# Patient Record
Sex: FEMALE | Race: WHITE | NOT HISPANIC OR LATINO | Employment: UNEMPLOYED | URBAN - METROPOLITAN AREA
[De-identification: names, ages, dates, MRNs, and addresses within clinical notes are randomized per-mention and may not be internally consistent; named-entity substitution may affect disease eponyms.]

---

## 2019-01-18 PROCEDURE — 99285 EMERGENCY DEPT VISIT HI MDM: CPT | Mod: ,,, | Performed by: EMERGENCY MEDICINE

## 2019-01-18 PROCEDURE — 99285 PR EMERGENCY DEPT VISIT,LEVEL V: ICD-10-PCS | Mod: ,,, | Performed by: EMERGENCY MEDICINE

## 2019-01-18 PROCEDURE — 99285 EMERGENCY DEPT VISIT HI MDM: CPT | Mod: 25

## 2019-01-19 ENCOUNTER — HOSPITAL ENCOUNTER (INPATIENT)
Facility: HOSPITAL | Age: 24
LOS: 9 days | Discharge: HOME | DRG: 897 | End: 2019-01-28
Attending: PSYCHIATRY & NEUROLOGY | Admitting: PSYCHIATRY & NEUROLOGY
Payer: COMMERCIAL

## 2019-01-19 ENCOUNTER — HOSPITAL ENCOUNTER (EMERGENCY)
Facility: HOSPITAL | Age: 24
Discharge: PSYCHIATRIC OR SUBSTANCE USE FACILITY | End: 2019-01-19
Attending: EMERGENCY MEDICINE
Payer: COMMERCIAL

## 2019-01-19 ENCOUNTER — HOSPITAL ENCOUNTER (EMERGENCY)
Facility: HOSPITAL | Age: 24
End: 2019-01-19
Attending: EMERGENCY MEDICINE
Payer: COMMERCIAL

## 2019-01-19 ENCOUNTER — HOSPITAL ENCOUNTER (INPATIENT)
Facility: HOSPITAL | Age: 24
LOS: 1 days | Discharge: ANOTHER HOSPITAL | DRG: 897 | End: 2019-01-19
Attending: PSYCHIATRY & NEUROLOGY | Admitting: PSYCHIATRY & NEUROLOGY
Payer: COMMERCIAL

## 2019-01-19 VITALS
RESPIRATION RATE: 12 BRPM | DIASTOLIC BLOOD PRESSURE: 77 MMHG | BODY MASS INDEX: 23.73 KG/M2 | HEART RATE: 76 BPM | WEIGHT: 110 LBS | HEIGHT: 57 IN | OXYGEN SATURATION: 100 % | TEMPERATURE: 98 F | SYSTOLIC BLOOD PRESSURE: 118 MMHG

## 2019-01-19 VITALS
RESPIRATION RATE: 12 BRPM | SYSTOLIC BLOOD PRESSURE: 89 MMHG | HEART RATE: 59 BPM | BODY MASS INDEX: 22.25 KG/M2 | DIASTOLIC BLOOD PRESSURE: 50 MMHG | HEIGHT: 60 IN | WEIGHT: 113.31 LBS

## 2019-01-19 VITALS
DIASTOLIC BLOOD PRESSURE: 61 MMHG | HEART RATE: 72 BPM | RESPIRATION RATE: 12 BRPM | TEMPERATURE: 98 F | OXYGEN SATURATION: 98 % | SYSTOLIC BLOOD PRESSURE: 99 MMHG

## 2019-01-19 DIAGNOSIS — M79.7 FIBROMYALGIA: ICD-10-CM

## 2019-01-19 DIAGNOSIS — I95.9 TRANSIENT HYPOTENSION: Primary | ICD-10-CM

## 2019-01-19 DIAGNOSIS — F12.20 CANNABIS USE DISORDER, SEVERE, DEPENDENCE: Chronic | ICD-10-CM

## 2019-01-19 DIAGNOSIS — R45.1 AGITATION: Primary | ICD-10-CM

## 2019-01-19 DIAGNOSIS — I95.9 HYPOTENSION: ICD-10-CM

## 2019-01-19 DIAGNOSIS — F19.950 SUBSTANCE-INDUCED PSYCHOTIC DISORDER WITH DELUSIONS: ICD-10-CM

## 2019-01-19 DIAGNOSIS — F29 PSYCHOSIS: ICD-10-CM

## 2019-01-19 DIAGNOSIS — F29 PSYCHOSIS, UNSPECIFIED PSYCHOSIS TYPE: ICD-10-CM

## 2019-01-19 DIAGNOSIS — I95.9 HYPOTENSION, UNSPECIFIED HYPOTENSION TYPE: ICD-10-CM

## 2019-01-19 DIAGNOSIS — F15.20 AMPHETAMINE USE DISORDER, SEVERE, DEPENDENCE: Primary | Chronic | ICD-10-CM

## 2019-01-19 LAB
ALBUMIN SERPL BCP-MCNC: 4 G/DL
ALP SERPL-CCNC: 76 U/L
ALT SERPL W/O P-5'-P-CCNC: 10 U/L
AMPHET+METHAMPHET UR QL: NORMAL
ANION GAP SERPL CALC-SCNC: 10 MMOL/L
APAP SERPL-MCNC: <3 UG/ML
AST SERPL-CCNC: 20 U/L
B-HCG UR QL: NEGATIVE
BARBITURATES UR QL SCN>200 NG/ML: NEGATIVE
BASOPHILS # BLD AUTO: 0.05 K/UL
BASOPHILS NFR BLD: 0.3 %
BENZODIAZ UR QL SCN>200 NG/ML: NORMAL
BILIRUB SERPL-MCNC: 0.2 MG/DL
BILIRUB UR QL STRIP: NEGATIVE
BUN SERPL-MCNC: 10 MG/DL
BZE UR QL SCN: NEGATIVE
CALCIUM SERPL-MCNC: 9.3 MG/DL
CANNABINOIDS UR QL SCN: NEGATIVE
CHLORIDE SERPL-SCNC: 110 MMOL/L
CLARITY UR REFRACT.AUTO: CLEAR
CO2 SERPL-SCNC: 22 MMOL/L
COLOR UR AUTO: YELLOW
CREAT SERPL-MCNC: 0.8 MG/DL
CREAT UR-MCNC: 203 MG/DL
CTP QC/QA: YES
DIFFERENTIAL METHOD: ABNORMAL
EOSINOPHIL # BLD AUTO: 0.3 K/UL
EOSINOPHIL NFR BLD: 1.8 %
ERYTHROCYTE [DISTWIDTH] IN BLOOD BY AUTOMATED COUNT: 12.7 %
EST. GFR  (AFRICAN AMERICAN): >60 ML/MIN/1.73 M^2
EST. GFR  (NON AFRICAN AMERICAN): >60 ML/MIN/1.73 M^2
ETHANOL SERPL-MCNC: <10 MG/DL
GLUCOSE SERPL-MCNC: 93 MG/DL
GLUCOSE UR QL STRIP: NEGATIVE
HCT VFR BLD AUTO: 38.4 %
HGB BLD-MCNC: 12.4 G/DL
HGB UR QL STRIP: ABNORMAL
HYALINE CASTS UR QL AUTO: 1 /LPF
IMM GRANULOCYTES # BLD AUTO: 0.04 K/UL
IMM GRANULOCYTES NFR BLD AUTO: 0.3 %
KETONES UR QL STRIP: NEGATIVE
LEUKOCYTE ESTERASE UR QL STRIP: ABNORMAL
LYMPHOCYTES # BLD AUTO: 2.3 K/UL
LYMPHOCYTES NFR BLD: 15.1 %
MCH RBC QN AUTO: 29.2 PG
MCHC RBC AUTO-ENTMCNC: 32.3 G/DL
MCV RBC AUTO: 91 FL
METHADONE UR QL SCN>300 NG/ML: NEGATIVE
MICROSCOPIC COMMENT: NORMAL
MONOCYTES # BLD AUTO: 1 K/UL
MONOCYTES NFR BLD: 6.6 %
NEUTROPHILS # BLD AUTO: 11.3 K/UL
NEUTROPHILS NFR BLD: 75.9 %
NITRITE UR QL STRIP: NEGATIVE
NRBC BLD-RTO: 0 /100 WBC
OPIATES UR QL SCN: NEGATIVE
PCP UR QL SCN>25 NG/ML: NEGATIVE
PH UR STRIP: 6 [PH] (ref 5–8)
PLATELET # BLD AUTO: 316 K/UL
PMV BLD AUTO: 9.2 FL
POCT GLUCOSE: 83 MG/DL (ref 70–110)
POTASSIUM SERPL-SCNC: 3.5 MMOL/L
PROT SERPL-MCNC: 7.4 G/DL
PROT UR QL STRIP: NEGATIVE
RBC # BLD AUTO: 4.24 M/UL
RBC #/AREA URNS AUTO: 4 /HPF (ref 0–4)
SODIUM SERPL-SCNC: 142 MMOL/L
SP GR UR STRIP: 1.02 (ref 1–1.03)
SQUAMOUS #/AREA URNS AUTO: 3 /HPF
TOXICOLOGY INFORMATION: NORMAL
TSH SERPL DL<=0.005 MIU/L-ACNC: 1.37 UIU/ML
URN SPEC COLLECT METH UR: ABNORMAL
WBC # BLD AUTO: 14.89 K/UL
WBC #/AREA URNS AUTO: 4 /HPF (ref 0–5)

## 2019-01-19 PROCEDURE — 93005 ELECTROCARDIOGRAM TRACING: CPT

## 2019-01-19 PROCEDURE — 99284 EMERGENCY DEPT VISIT MOD MDM: CPT | Mod: 25

## 2019-01-19 PROCEDURE — 99223 1ST HOSP IP/OBS HIGH 75: CPT | Mod: ,,, | Performed by: PSYCHIATRY & NEUROLOGY

## 2019-01-19 PROCEDURE — 80320 DRUG SCREEN QUANTALCOHOLS: CPT

## 2019-01-19 PROCEDURE — 25000003 PHARM REV CODE 250: Performed by: EMERGENCY MEDICINE

## 2019-01-19 PROCEDURE — 99284 EMERGENCY DEPT VISIT MOD MDM: CPT | Mod: ,,, | Performed by: EMERGENCY MEDICINE

## 2019-01-19 PROCEDURE — 96360 HYDRATION IV INFUSION INIT: CPT

## 2019-01-19 PROCEDURE — 80053 COMPREHEN METABOLIC PANEL: CPT

## 2019-01-19 PROCEDURE — 84443 ASSAY THYROID STIM HORMONE: CPT

## 2019-01-19 PROCEDURE — 12400001 HC PSYCH SEMI-PRIVATE ROOM

## 2019-01-19 PROCEDURE — 99223 PR INITIAL HOSPITAL CARE,LEVL III: ICD-10-PCS | Mod: ,,, | Performed by: PSYCHIATRY & NEUROLOGY

## 2019-01-19 PROCEDURE — 63600175 PHARM REV CODE 636 W HCPCS: Performed by: EMERGENCY MEDICINE

## 2019-01-19 PROCEDURE — 99284 PR EMERGENCY DEPT VISIT,LEVEL IV: ICD-10-PCS | Mod: ,,, | Performed by: EMERGENCY MEDICINE

## 2019-01-19 PROCEDURE — 85025 COMPLETE CBC W/AUTO DIFF WBC: CPT

## 2019-01-19 PROCEDURE — 93010 ELECTROCARDIOGRAM REPORT: CPT | Mod: ,,, | Performed by: INTERNAL MEDICINE

## 2019-01-19 PROCEDURE — 25000003 PHARM REV CODE 250: Performed by: INTERNAL MEDICINE

## 2019-01-19 PROCEDURE — 80307 DRUG TEST PRSMV CHEM ANLYZR: CPT

## 2019-01-19 PROCEDURE — 96361 HYDRATE IV INFUSION ADD-ON: CPT

## 2019-01-19 PROCEDURE — 81025 URINE PREGNANCY TEST: CPT | Performed by: EMERGENCY MEDICINE

## 2019-01-19 PROCEDURE — 80329 ANALGESICS NON-OPIOID 1 OR 2: CPT

## 2019-01-19 PROCEDURE — 81001 URINALYSIS AUTO W/SCOPE: CPT | Mod: 59

## 2019-01-19 PROCEDURE — 93010 EKG 12-LEAD: ICD-10-PCS | Mod: ,,, | Performed by: INTERNAL MEDICINE

## 2019-01-19 RX ORDER — HALOPERIDOL 5 MG/ML
5 INJECTION INTRAMUSCULAR
Status: COMPLETED | OUTPATIENT
Start: 2019-01-19 | End: 2019-01-19

## 2019-01-19 RX ORDER — DOCUSATE SODIUM 100 MG/1
100 CAPSULE, LIQUID FILLED ORAL DAILY PRN
Status: DISCONTINUED | OUTPATIENT
Start: 2019-01-19 | End: 2019-01-28 | Stop reason: HOSPADM

## 2019-01-19 RX ORDER — FOLIC ACID 1 MG/1
1 TABLET ORAL DAILY
Status: CANCELLED | OUTPATIENT
Start: 2019-01-20

## 2019-01-19 RX ORDER — DOCUSATE SODIUM 100 MG/1
100 CAPSULE, LIQUID FILLED ORAL DAILY PRN
Status: DISCONTINUED | OUTPATIENT
Start: 2019-01-19 | End: 2019-01-19 | Stop reason: HOSPADM

## 2019-01-19 RX ORDER — ACETAMINOPHEN 325 MG/1
650 TABLET ORAL EVERY 6 HOURS PRN
Status: DISCONTINUED | OUTPATIENT
Start: 2019-01-19 | End: 2019-01-19 | Stop reason: HOSPADM

## 2019-01-19 RX ORDER — CALCIUM CARBONATE 200(500)MG
1000 TABLET,CHEWABLE ORAL EVERY 8 HOURS PRN
Status: DISCONTINUED | OUTPATIENT
Start: 2019-01-19 | End: 2019-01-28 | Stop reason: HOSPADM

## 2019-01-19 RX ORDER — OLANZAPINE 10 MG/2ML
5 INJECTION, POWDER, FOR SOLUTION INTRAMUSCULAR EVERY 6 HOURS PRN
Status: DISCONTINUED | OUTPATIENT
Start: 2019-01-19 | End: 2019-01-28 | Stop reason: HOSPADM

## 2019-01-19 RX ORDER — DIPHENHYDRAMINE HYDROCHLORIDE 50 MG/ML
50 INJECTION INTRAMUSCULAR; INTRAVENOUS EVERY 4 HOURS PRN
Status: DISCONTINUED | OUTPATIENT
Start: 2019-01-19 | End: 2019-01-19

## 2019-01-19 RX ORDER — IBUPROFEN 200 MG
1 TABLET ORAL DAILY PRN
Status: DISCONTINUED | OUTPATIENT
Start: 2019-01-19 | End: 2019-01-28 | Stop reason: HOSPADM

## 2019-01-19 RX ORDER — IBUPROFEN 200 MG
1 TABLET ORAL DAILY
Status: DISCONTINUED | OUTPATIENT
Start: 2019-01-19 | End: 2019-01-19 | Stop reason: HOSPADM

## 2019-01-19 RX ORDER — ACETAMINOPHEN 325 MG/1
650 TABLET ORAL EVERY 6 HOURS PRN
Status: DISCONTINUED | OUTPATIENT
Start: 2019-01-19 | End: 2019-01-28 | Stop reason: HOSPADM

## 2019-01-19 RX ORDER — OLANZAPINE 10 MG/2ML
10 INJECTION, POWDER, FOR SOLUTION INTRAMUSCULAR EVERY 8 HOURS PRN
Status: DISCONTINUED | OUTPATIENT
Start: 2019-01-19 | End: 2019-01-19 | Stop reason: HOSPADM

## 2019-01-19 RX ORDER — DIPHENHYDRAMINE HYDROCHLORIDE 50 MG/ML
25 INJECTION INTRAMUSCULAR; INTRAVENOUS
Status: COMPLETED | OUTPATIENT
Start: 2019-01-19 | End: 2019-01-19

## 2019-01-19 RX ORDER — OLANZAPINE 5 MG/1
5 TABLET ORAL EVERY 6 HOURS PRN
Status: DISCONTINUED | OUTPATIENT
Start: 2019-01-19 | End: 2019-01-28 | Stop reason: HOSPADM

## 2019-01-19 RX ORDER — FOLIC ACID 1 MG/1
1 TABLET ORAL DAILY
Status: DISCONTINUED | OUTPATIENT
Start: 2019-01-19 | End: 2019-01-19 | Stop reason: HOSPADM

## 2019-01-19 RX ORDER — HYDROXYZINE PAMOATE 50 MG/1
50 CAPSULE ORAL NIGHTLY PRN
Status: DISCONTINUED | OUTPATIENT
Start: 2019-01-19 | End: 2019-01-25

## 2019-01-19 RX ORDER — OLANZAPINE 10 MG/1
10 TABLET ORAL EVERY 8 HOURS PRN
Status: DISCONTINUED | OUTPATIENT
Start: 2019-01-19 | End: 2019-01-19 | Stop reason: HOSPADM

## 2019-01-19 RX ORDER — CALCIUM CARBONATE 200(500)MG
1000 TABLET,CHEWABLE ORAL EVERY 8 HOURS PRN
Status: DISCONTINUED | OUTPATIENT
Start: 2019-01-19 | End: 2019-01-19 | Stop reason: HOSPADM

## 2019-01-19 RX ORDER — HALOPERIDOL 5 MG/ML
5 INJECTION INTRAMUSCULAR EVERY 4 HOURS PRN
Status: DISCONTINUED | OUTPATIENT
Start: 2019-01-19 | End: 2019-01-19 | Stop reason: HOSPADM

## 2019-01-19 RX ORDER — SODIUM CHLORIDE 9 MG/ML
1000 INJECTION, SOLUTION INTRAVENOUS
Status: COMPLETED | OUTPATIENT
Start: 2019-01-19 | End: 2019-01-19

## 2019-01-19 RX ORDER — LORAZEPAM 2 MG/ML
2 INJECTION INTRAMUSCULAR EVERY 4 HOURS PRN
Status: DISCONTINUED | OUTPATIENT
Start: 2019-01-19 | End: 2019-01-19 | Stop reason: HOSPADM

## 2019-01-19 RX ORDER — LORAZEPAM 2 MG/ML
2 INJECTION INTRAMUSCULAR
Status: COMPLETED | OUTPATIENT
Start: 2019-01-19 | End: 2019-01-19

## 2019-01-19 RX ADMIN — LORAZEPAM 2 MG: 2 INJECTION, SOLUTION INTRAMUSCULAR; INTRAVENOUS at 01:01

## 2019-01-19 RX ADMIN — SODIUM CHLORIDE 1000 ML: 0.9 INJECTION, SOLUTION INTRAVENOUS at 04:01

## 2019-01-19 RX ADMIN — DIPHENHYDRAMINE HYDROCHLORIDE 25 MG: 50 INJECTION INTRAMUSCULAR; INTRAVENOUS at 01:01

## 2019-01-19 RX ADMIN — SODIUM CHLORIDE 1000 ML: 0.9 INJECTION, SOLUTION INTRAVENOUS at 03:01

## 2019-01-19 RX ADMIN — HALOPERIDOL LACTATE 5 MG: 5 INJECTION, SOLUTION INTRAMUSCULAR at 01:01

## 2019-01-19 NOTE — ED NOTES
Patient accepted by Kaycee at [facility] ([address], [place], [place]) for the service of Dr. Schultz.   Report to be called to [facility] unit [phone].
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
## 2019-01-19 NOTE — NURSING
Did rounds on patient.  Difficult to arouse. Did not arouse with verbal stimuli.  Minimal arousal with tactile stimuli. Dr. Gr in the room.  Blood pressure 72/46 with pulse 64.  Retook blood pressure 72/46 with a pulse of 60.  Dr. Gr ask for a rapid respond.  Rapid response called.  Blood sugar taken 83.  Pulse Ox 100.  EKG WNL.  Rapid response team wanted patient discharged to the ED.  Orders received and patient transferred to ED.  Report given to [place] charge nurse given report.  Unit director notified of situation.  Patient to return to the unit when medically stable.

## 2019-01-19 NOTE — SUBJECTIVE & OBJECTIVE
Patient History           Medical as of 1/19/2019    Past Medical History: Patient provided no pertinent medical history.           Surgical as of 1/19/2019    Past Surgical History: Patient provided no pertinent surgical history.           Family as of 1/19/2019    None           Tobacco Use as of 1/19/2019     Smoking Status Smoking Start Date Smoking Quit Date Packs/Day Years Used    Current Every Day Smoker -- -- 1.00 --    Types Comments Smokeless Tobacco Status Smokeless Tobacco Quit Date Source     Cigarettes -- Unknown -- Provider            Alcohol Use as of 1/19/2019     Alcohol Use Drinks/Week Alcohol/Week Comments Source    No -- -- -- Provider    Frequency Standard Drinks Binge Drinking Source      Never -- -- Provider             Drug Use as of 1/19/2019     Drug Use Types Frequency Comments Source    Yes  Methamphetamines -- -- Provider            Sexual Activity as of 1/19/2019     Sexually Active Birth Control Partners Comments Source    -- -- -- -- Provider            Activities of Daily Living as of 1/19/2019    None           Social Documentation as of 1/19/2019    None           Occupational as of 1/19/2019    None           Socioeconomic as of 1/19/2019     Marital Status Spouse Name Number of Children Years Education Education Level Preferred Language Ethnicity Race Source    Single -- -- -- -- English /White White --    Financial Resource Strain Food Insecurity: Worry Food Insecurity: Inability Transportation Needs: Medical Transportation Needs: Non-medical       -- -- -- -- --             Pertinent History     Question Response Comments    Lives with -- --    Place in Birth Order -- --    Lives in -- --    Number of Siblings -- --    Raised by -- --    Legal Involvement -- --    Childhood Trauma -- --    Criminal History of -- --    Financial Status -- --    Highest Level of Education -- --    Does patient have access to a firearm? -- --     Service -- --    Primary  "Leisure Activity -- --    Spirituality -- --        History reviewed. No pertinent past medical history.  History reviewed. No pertinent surgical history.  Family History     None        Tobacco Use    Smoking status: Current Every Day Smoker     Packs/day: 1.00     Types: Cigarettes   Substance and Sexual Activity    Alcohol use: No     Frequency: Never    Drug use: Yes     Types: Methamphetamines    Sexual activity: Not on file     Review of patient's allergies indicates:   Allergen Reactions    Cat/feline products        No current facility-administered medications on file prior to encounter.      No current outpatient medications on file prior to encounter.     Psychotherapeutics (From admission, onward)    Start     Stop Route Frequency Ordered    01/19/19 0440  lorazepam injection 2 mg  (Psych Hold Orders)      -- IM Every 4 hours PRN 01/19/19 0340    01/19/19 0440  haloperidol lactate injection 5 mg  (Psych Hold Orders)      -- IM Every 4 hours PRN 01/19/19 0340        Review of Systems  Strengths and Liabilities: Liability: Patient is hostile., Liability: Patient is impulsive., Liability: Patient has poor judgment, Liability: Patient lacks coping skills.    Objective:     Vital Signs (Most Recent):  Temp: 98.3 °F (36.8 °C) (01/18/19 2354)  Pulse: (!) 117 (01/18/19 2354)  Resp: 18 (01/18/19 2354)  BP: 126/76 (01/18/19 2354) Vital Signs (24h Range):  Temp:  [98.3 °F (36.8 °C)] 98.3 °F (36.8 °C)  Pulse:  [117] 117  Resp:  [18] 18  BP: (126)/(76) 126/76     Height: 4' 9" (144.8 cm)  Weight: 49.9 kg (110 lb)  Body mass index is 23.8 kg/m².    No intake or output data in the 24 hours ending 01/19/19 0455    Physical Exam   Psychiatric:   Mental Status Exam:  Appearance: unremarkable, age appropriate, lying in bed, breathing comfortably, unarousable to verbal or tactile stimuli  Level of Consciousness: unarousable to verbal or tactile stimuli  Behavior/Cooperation: Unable to assess  Psychomotor: Unable to " assess   Speech: Unable to assess  Language: english, fluid  Orientation: Unable to assess  Attention Span/Concentration: Unable to assess  Memory: Unable to assess  Mood: Unable to assess  Affect: Unable to assess  Thought Process: Unable to assess  Associations: Unable to assess  Thought Content: Unable to assess  Fund of Knowledge: Unable to assess  Abstraction: Unable to assess  Insight: Unable to assess  Judgment: Unable to assess          Significant Labs:   Last 24 Hours:   Recent Lab Results       01/19/19  0151   01/19/19  0140        Benzodiazepines   Presumptive Positive     Methadone metabolites   Negative     Phencyclidine   Negative     Immature Granulocytes 0.3       Immature Grans (Abs) 0.04  Comment:  Mild elevation in immature granulocytes is non specific and   can be seen in a variety of conditions including stress response,   acute inflammation, trauma and pregnancy. Correlation with other   laboratory and clinical findings is essential.         Acetaminophen (Tylenol), Serum <3.0  Comment:  Toxic Levels:  Adults (4 hr post-ingestion).........>150 ug/mL  Adults (12 hr post-ingestion)........>40 ug/mL  Peds (2 hr post-ingestion, liquid)...>225 ug/mL         Albumin 4.0       Alcohol, Medical, Serum <10       Alkaline Phosphatase 76       ALT 10       Amphetamine Screen, Ur   Presumptive Positive     Anion Gap 10       Appearance, UA   Clear     AST 20       Barbiturate Screen, Ur   Negative     Baso # 0.05       Basophil% 0.3       Bilirubin (UA)   Negative     Total Bilirubin 0.2  Comment:  For infants and newborns, interpretation of results should be based  on gestational age, weight and in agreement with clinical  observations.  Premature Infant recommended reference ranges:  Up to 24 hours.............<8.0 mg/dL  Up to 48 hours............<12.0 mg/dL  3-5 days..................<15.0 mg/dL  6-29 days.................<15.0 mg/dL         BUN, Bld 10       Calcium 9.3       Chloride 110       CO2  22       Cocaine (Metab.)   Negative     Color, UA   Yellow     Creatinine 0.8       Creatinine, Random Ur   203.0  Comment:  The random urine reference ranges provided were established   for 24 hour urine collections.  No reference ranges exist for  random urine specimens.  Correlate clinically.       Differential Method Automated       eGFR if  >60.0       eGFR if non  >60.0  Comment:  Calculation used to obtain the estimated glomerular filtration  rate (eGFR) is the CKD-EPI equation.          Eos # 0.3       Eosinophil% 1.8       Glucose 93       Glucose, UA   Negative     Gran # (ANC) 11.3       Gran% 75.9       Hematocrit 38.4       Hemoglobin 12.4       Hyaline Casts, UA   1     Ketones, UA   Negative     Leukocytes, UA   Trace     Lymph # 2.3       Lymph% 15.1       MCH 29.2       MCHC 32.3       MCV 91       Microscopic Comment   SEE COMMENT  Comment:  Other formed elements not mentioned in the report are not   present in the microscopic examination.        Mono # 1.0       Mono% 6.6       MPV 9.2       Nitrite, UA   Negative     nRBC 0       Occult Blood UA   2+     Opiate Scrn, Ur   Negative     pH, UA   6.0     Platelets 316       Potassium 3.5       Preg Test, Ur Negative       Total Protein 7.4       Protein, UA   Negative  Comment:  Recommend a 24 hour urine protein or a urine   protein/creatinine ratio if globulin induced proteinuria is  clinically suspected.        Acceptable Yes       RBC 4.24       RBC, UA   4     RDW 12.7       Sodium 142       Specific Gravity, UA   1.020     Specimen UA   Urine, Clean Catch     Squam Epithel, UA   3     Marijuana (THC) Metabolite   Negative     Toxicology Information   SEE COMMENT  Comment:  This screen includes the following classes of drugs at the   listed cut-off:  Benzodiazepines                  200 ng/ml  Methadone                        300 ng/ml  Cocaine metabolite               300 ng/ml  Opiates                           300 ng/ml  Barbiturates                     200 ng/ml  Amphetamines                    1000 ng/ml  Marijuana metabs (THC)            50 ng/ml  Phencyclidine (PCP)               25 ng/ml  High concentrations of Diphenhydramine may cross-react with  Phencyclidine PCP screening immunoassay giving a false   positive result.  High concentrations of Methylenedioxymethamphetamine (MDMA aka  Ectasy) and other structurally similar compounds may cross-   react with the Amphetamine/Methamphetamine screening   immunoassay giving a false positive result.  A metabolite of the anti-HIV drug Sustiva () may cause  false positive results in the Marijuana metabolite (THC)   screening assay.  Note: This exception list includes only more common   interferants in toxicology screen testing.  Because of many   cross-reactantspositive results on toxicology drug screens   should be confirmed whenever results do not correlate with   clinical presentation.  This report is intended for use in clinical monitoring and  management of patients. It is not intended for use in   employment related drug testing.  Because of any cross-reactants, positive results on toxicology  drug screens should be confirmed whenever results do not  correlate with clinical presentation.  Presumptive positive results are unconfirmed and may be used   only for medical purposes.       TSH 1.371       WBC, UA   4     WBC 14.89             Significant Imaging: I have reviewed all pertinent imaging results/findings within the past 24 hours.

## 2019-01-19 NOTE — NURSING
Took report from Bashir PUTNAM.  Patient is lying in bed.  Sedated.  Respirations are even and non labored.  No evidence of pain or discomfort.  Vital signs taken. Will cont to monitor.

## 2019-01-19 NOTE — PLAN OF CARE
01/19/19 1100   [facility] Therapy   Group Name Education   Specific Interventions Coping Skills Training   Participation Level None   Participation Quality Refused

## 2019-01-19 NOTE — ED NOTES
Report and care to [facility] in [facility]. Plan to transport patient back in wheelchair per this RN and security.
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
## 2019-01-19 NOTE — SUBJECTIVE & OBJECTIVE
Patient History           Medical as of 1/19/2019    Past Medical History: Patient provided no pertinent medical history.           Surgical as of 1/19/2019    Past Surgical History: Patient provided no pertinent surgical history.           Family as of 1/19/2019    None           Tobacco Use as of 1/19/2019     Smoking Status Smoking Start Date Smoking Quit Date Packs/Day Years Used    Current Every Day Smoker -- -- 1.00 --    Types Comments Smokeless Tobacco Status Smokeless Tobacco Quit Date Source     Cigarettes -- Unknown -- Provider            Alcohol Use as of 1/19/2019     Alcohol Use Drinks/Week Alcohol/Week Comments Source    No -- -- -- Provider    Frequency Standard Drinks Binge Drinking Source      Never -- -- Provider             Drug Use as of 1/19/2019     Drug Use Types Frequency Comments Source    Yes  Methamphetamines -- -- Provider            Sexual Activity as of 1/19/2019     Sexually Active Birth Control Partners Comments Source    -- -- -- -- Provider            Activities of Daily Living as of 1/19/2019    None           Social Documentation as of 1/19/2019    None           Occupational as of 1/19/2019    None           Socioeconomic as of 1/19/2019     Marital Status Spouse Name Number of Children Years Education Education Level Preferred Language Ethnicity Race Source    Single -- -- -- -- English /White White --    Financial Resource Strain Food Insecurity: Worry Food Insecurity: Inability Transportation Needs: Medical Transportation Needs: Non-medical       -- -- -- -- --             Pertinent History     Question Response Comments    Lives with -- --    Place in Birth Order -- --    Lives in -- --    Number of Siblings -- --    Raised by -- --    Legal Involvement -- --    Childhood Trauma -- --    Criminal History of -- --    Financial Status -- --    Highest Level of Education -- --    Does patient have access to a firearm? -- --     Service -- --    Primary  Leisure Activity -- --    Spirituality -- --        No past medical history on file.  No past surgical history on file.  Family History     None        Tobacco Use    Smoking status: Current Every Day Smoker     Packs/day: 1.00     Types: Cigarettes   Substance and Sexual Activity    Alcohol use: No     Frequency: Never    Drug use: Yes     Types: Methamphetamines    Sexual activity: Not on file     Review of patient's allergies indicates:   Allergen Reactions    Cat/feline products        Current Facility-Administered Medications on File Prior to Encounter   Medication    acetaminophen tablet 650 mg    [COMPLETED] diphenhydrAMINE injection 25 mg    [COMPLETED] haloperidol lactate injection 5 mg    haloperidol lactate injection 5 mg    [COMPLETED] lorazepam injection 2 mg    lorazepam injection 2 mg    sodium chloride 0.9% bolus 1,000 mL    [COMPLETED] sodium chloride 0.9% bolus 1,000 mL    sodium chloride 0.9% bolus 1,000 mL    [DISCONTINUED] diphenhydrAMINE injection 50 mg     No current outpatient medications on file prior to encounter.     Psychotherapeutics (From admission, onward)    None        Review of Systems  Strengths and Liabilities: Liability: Patient is hostile., Liability: Patient is impulsive., Liability: Patient lacks coping skills.    Objective:     Vital Signs (Most Recent):    Vital Signs (24h Range):  Temp:  [98.3 °F (36.8 °C)] 98.3 °F (36.8 °C)  Pulse:  [117] 117  Resp:  [18] 18  BP: (126)/(76) 126/76         There is no height or weight on file to calculate BMI.    No intake or output data in the 24 hours ending 01/19/19 0554    Physical Exam   Constitutional: She appears well-developed and well-nourished.   HENT:   Head: Normocephalic and atraumatic.   Neck: No tracheal deviation present. No thyromegaly present.   Cardiovascular: Normal rate, regular rhythm and normal heart sounds.   Pulmonary/Chest: Effort normal and breath sounds normal.   Abdominal: Soft. Bowel sounds are  normal. She exhibits no distension.   Musculoskeletal: She exhibits no edema or deformity.   Neurological:   Sedated, does not awaken to verbal or tactile stimulation   Skin: Skin is warm and dry.   Psychiatric:   Mental Status Exam:  Appearance: unremarkable, age appropriate, lying in bed, breathing comfortably, unarousable to verbal or tactile stimuli  Level of Consciousness: unarousable to verbal or tactile stimuli  Behavior/Cooperation: Unable to assess  Psychomotor: Unable to assess   Speech: Unable to assess  Language: english, fluid  Orientation: Unable to assess  Attention Span/Concentration: Unable to assess  Memory: Unable to assess  Mood: Unable to assess  Affect: Unable to assess  Thought Process: Unable to assess  Associations: Unable to assess  Thought Content: Unable to assess  Fund of Knowledge: Unable to assess  Abstraction: Unable to assess  Insight: Unable to assess  Judgment: Unable to assess       NEUROLOGICAL EXAMINATION:     MENTAL STATUS        Pt received PRN for agitation, currently sedated, does not arouse to verbal or tactile stimulation.  Exam deferred until pt able to participate.     CRANIAL NERVES        Pt received PRN for agitation, currently sedated, does not arouse to verbal or tactile stimulation.  Exam deferred until pt able to participate.     MOTOR EXAM        Pt received PRN for agitation, currently sedated, does not arouse to verbal or tactile stimulation.  Exam deferred until pt able to participate.     REFLEXES        Pt received PRN for agitation, currently sedated, does not arouse to verbal or tactile stimulation.  Exam deferred until pt able to participate.     SENSORY EXAM        Pt received PRN for agitation, currently sedated, does not arouse to verbal or tactile stimulation.  Exam deferred until pt able to participate.     GAIT AND COORDINATION        Pt received PRN for agitation, currently sedated, does not arouse to verbal or tactile stimulation.  Exam deferred  until pt able to participate.     Significant Labs:   Last 24 Hours:   Recent Lab Results       01/19/19  0151   01/19/19  0140        Benzodiazepines   Presumptive Positive     Methadone metabolites   Negative     Phencyclidine   Negative     Immature Granulocytes 0.3       Immature Grans (Abs) 0.04  Comment:  Mild elevation in immature granulocytes is non specific and   can be seen in a variety of conditions including stress response,   acute inflammation, trauma and pregnancy. Correlation with other   laboratory and clinical findings is essential.         Acetaminophen (Tylenol), Serum <3.0  Comment:  Toxic Levels:  Adults (4 hr post-ingestion).........>150 ug/mL  Adults (12 hr post-ingestion)........>40 ug/mL  Peds (2 hr post-ingestion, liquid)...>225 ug/mL         Albumin 4.0       Alcohol, Medical, Serum <10       Alkaline Phosphatase 76       ALT 10       Amphetamine Screen, Ur   Presumptive Positive     Anion Gap 10       Appearance, UA   Clear     AST 20       Barbiturate Screen, Ur   Negative     Baso # 0.05       Basophil% 0.3       Bilirubin (UA)   Negative     Total Bilirubin 0.2  Comment:  For infants and newborns, interpretation of results should be based  on gestational age, weight and in agreement with clinical  observations.  Premature Infant recommended reference ranges:  Up to 24 hours.............<8.0 mg/dL  Up to 48 hours............<12.0 mg/dL  3-5 days..................<15.0 mg/dL  6-29 days.................<15.0 mg/dL         BUN, Bld 10       Calcium 9.3       Chloride 110       CO2 22       Cocaine (Metab.)   Negative     Color, UA   Yellow     Creatinine 0.8       Creatinine, Random Ur   203.0  Comment:  The random urine reference ranges provided were established   for 24 hour urine collections.  No reference ranges exist for  random urine specimens.  Correlate clinically.       Differential Method Automated       eGFR if  >60.0       eGFR if non African American  >60.0  Comment:  Calculation used to obtain the estimated glomerular filtration  rate (eGFR) is the CKD-EPI equation.          Eos # 0.3       Eosinophil% 1.8       Glucose 93       Glucose, UA   Negative     Gran # (ANC) 11.3       Gran% 75.9       Hematocrit 38.4       Hemoglobin 12.4       Hyaline Casts, UA   1     Ketones, UA   Negative     Leukocytes, UA   Trace     Lymph # 2.3       Lymph% 15.1       MCH 29.2       MCHC 32.3       MCV 91       Microscopic Comment   SEE COMMENT  Comment:  Other formed elements not mentioned in the report are not   present in the microscopic examination.        Mono # 1.0       Mono% 6.6       MPV 9.2       Nitrite, UA   Negative     nRBC 0       Occult Blood UA   2+     Opiate Scrn, Ur   Negative     pH, UA   6.0     Platelets 316       Potassium 3.5       Preg Test, Ur Negative       Total Protein 7.4       Protein, UA   Negative  Comment:  Recommend a 24 hour urine protein or a urine   protein/creatinine ratio if globulin induced proteinuria is  clinically suspected.        Acceptable Yes       RBC 4.24       RBC, UA   4     RDW 12.7       Sodium 142       Specific Gravity, UA   1.020     Specimen UA   Urine, Clean Catch     Squam Epithel, UA   3     Marijuana (THC) Metabolite   Negative     Toxicology Information   SEE COMMENT  Comment:  This screen includes the following classes of drugs at the   listed cut-off:  Benzodiazepines                  200 ng/ml  Methadone                        300 ng/ml  Cocaine metabolite               300 ng/ml  Opiates                          300 ng/ml  Barbiturates                     200 ng/ml  Amphetamines                    1000 ng/ml  Marijuana metabs (THC)            50 ng/ml  Phencyclidine (PCP)               25 ng/ml  High concentrations of Diphenhydramine may cross-react with  Phencyclidine PCP screening immunoassay giving a false   positive result.  High concentrations of Methylenedioxymethamphetamine (MDMA  aka  Ectasy) and other structurally similar compounds may cross-   react with the Amphetamine/Methamphetamine screening   immunoassay giving a false positive result.  A metabolite of the anti-HIV drug Sustiva () may cause  false positive results in the Marijuana metabolite (THC)   screening assay.  Note: This exception list includes only more common   interferants in toxicology screen testing.  Because of many   cross-reactantspositive results on toxicology drug screens   should be confirmed whenever results do not correlate with   clinical presentation.  This report is intended for use in clinical monitoring and  management of patients. It is not intended for use in   employment related drug testing.  Because of any cross-reactants, positive results on toxicology  drug screens should be confirmed whenever results do not  correlate with clinical presentation.  Presumptive positive results are unconfirmed and may be used   only for medical purposes.       TSH 1.371       WBC, UA   4     WBC 14.89           Significant Imaging: I have reviewed all pertinent imaging results/findings within the past 24 hours.
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
## 2019-01-19 NOTE — ED NOTES
Pt. Remains in paper scrubs per protocol, somnolent, fluids ongoing for bp 76 systolic. Arouses to noxious stimuli, falls back to sleep. Does not follow commands. On cont. bp monitor, sitter at bs, will continue to monitor.
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
## 2019-01-19 NOTE — ASSESSMENT & PLAN NOTE
Pt is a 23yoF w/ PMHx of fibromyalgia and methamphetamine use who presented to [facility] for evaluation of paranoia and agitation.  Pt with worsening behavioral disturbance over past month in the setting of regular meth use.  Pt agitated in the ED requiring sedation with Haldol/Ativan/Benadryl.  Pt unable to participate with interview on initial attempt.  Spoke with pt's mother who provided entirety of history above.  DDx at this time includes but is not limited to: methamphetamine intoxication vs substance induced mood/psychotic disorder vs primary psychiatric illness.  UDS upon arrival w/ +amphetamines and +benzos (likely iatrogenic).  On account of pt's behavior upon arrival to [facility] ED as well as very concerning collateral, will admit to [facility] for psychiatric workup and management.    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, unclear severity  Methamphetamine intoxication  R/o substance induced psychosis vs substance induced mood d/o  R/o primary psychotic disorder  R/o cluster B personality disorder    Recommendations:   - Recommend to enact PEC if one is not already in place for danger to self and grave disability  - Recommend to seek inpatient psychiatric hospitalization when/if medically cleared, will attempt to admit to [facility]  - Defer initiation of scheduled psychotropic medications to future inpatient provider  - Recommend to obtain EKG given high likelihood for administration of neuroleptics in the future  - Recommend zyprexa 10mg po/im q8hrs PRN for non-redirectable agitation associated with breakthrough psychosis or musa, do not give within one hour of ativan, do not exceed >30mg total daily, offer po first  - Defer medical workup to primary team, defer non-psychiatric medication management    Case discussed with Dr. Gilmore and psychiatric staff on call Dr. Gr.

## 2019-01-19 NOTE — ED NOTES
Pt. Ambulatory to bathroom with steady gait. Physician aware. Given cracker for PO challenge with apple juice, passed. Physician also aware. Sitter remains at bs. Plan to discharge back to [facility].

## 2019-01-19 NOTE — HOSPITAL COURSE
Patient was noted to be hypotensive on admission during vitals check and rapid response was activated, she was then transported to ED for emergent further care and discharged.
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
## 2019-01-19 NOTE — ASSESSMENT & PLAN NOTE
Pt is a 23yoF w/ PMHx of fibromyalgia and methamphetamine use who presented to [facility] for evaluation of paranoia and agitation.  Pt with worsening behavioral disturbance over past month in the setting of regular meth use.  Pt agitated in the ED requiring sedation with Haldol/Ativan/Benadryl.  Pt unable to participate with interview on initial attempt.  Spoke with pt's mother who provided entirety of history above.  DDx at this time includes but is not limited to: methamphetamine intoxication vs substance induced mood/psychotic disorder vs primary psychiatric illness.  UDS upon arrival w/ +amphetamines and +benzos (likely iatrogenic).  On account of pt's behavior upon arrival to [facility] ED as well as very concerning collateral, will admit to [facility] for psychiatric workup and management.    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, unclear severity  Methamphetamine intoxication  R/o substance induced psychosis vs substance induced mood d/o  R/o primary psychotic disorder  R/o cluster B personality disorder    Recommendations:   - Recommend to enact PEC if one is not already in place for danger to self and grave disability  - Recommend to seek inpatient psychiatric hospitalization when/if medically cleared, will attempt to admit to [facility]  - Defer initiation of scheduled psychotropic medications to future inpatient provider  - Recommend to obtain EKG given high likelihood for administration of neuroleptics in the future  - Recommend zyprexa 10mg po/im q8hrs PRN for non-redirectable agitation associated with breakthrough psychosis or musa, do not give within one hour of ativan, do not exceed >30mg total daily, offer po first  - Defer medical workup to primary team, defer non-psychiatric medication management    Case discussed with Dr. Gilmore and psychiatric staff on call Dr. Gr.

## 2019-01-19 NOTE — ASSESSMENT & PLAN NOTE
Pt is a 23yoF w/ PMHx of fibromyalgia and methamphetamine use who presented to [facility] for evaluation of paranoia and agitation.  Pt with worsening behavioral disturbance over past month in the setting of regular meth use.  Pt agitated in the ED requiring sedation with Haldol/Ativan/Benadryl.  Pt unable to participate with interview on initial attempt.  Spoke with pt's mother who provided entirety of history above.  DDx at this time includes but is not limited to: methamphetamine intoxication vs substance induced mood/psychotic disorder vs primary psychiatric illness.  UDS upon arrival w/ +amphetamines and +benzos (likely iatrogenic).  On account of pt's behavior upon arrival to [facility] ED as well as very concerning collateral, will admit to [facility] for psychiatric workup and management.    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, unclear severity  Methamphetamine intoxication  R/o substance induced psychosis vs substance induced mood d/o  R/o primary psychotic disorder  R/o cluster B personality disorder    Recommendations:   - Continue PEC for danger to self and grave disability  - Defer initiation of scheduled psychotropic medications to allow washout of amphetamines, will need to assess baseline function  - Will obtain EKG given high likelihood for administration of neuroleptics in the future  - zyprexa 10mg po/im q8hrs PRN for non-redirectable agitation associated with breakthrough psychosis or musa, do not give within one hour of ativan, do not exceed >30mg total daily, offer po first  - Will obtain B12, Folate, TSH, RPR, HIV  - MVI daily, consider CRP/pre-albumin if concern arises regarding potential malnutrition

## 2019-01-19 NOTE — ED TRIAGE NOTES
Pt. Presents to ED today from [facility] with c/o hypotension. Pt. Somnolent, arouses to noxious stimuli, goes right back to sleep. BP in ED 76/47. IV established with bolus of fluids ongoing. Otherwise vss.

## 2019-01-19 NOTE — NURSING
Pt admitted to [facility]U from ED. Pt escorted in wheelchair by security, RN, and MHA. Pt is disheveled, lethargic and appears sedated. Pt is compliant but requires a lot of prompting and encouragement to follow directions. Pt is searched for contraband by Rosa ERIC. No contraband found. Per INDY Kruse in ED, pt has no valuables or other belongings. 1 silver colored chain, 1 wire bra and set of keys itemized and placed in pt locker. Pt is unable to participate in her assessment as she appears sedated. Pt refuses temp but allows this writer to take BP. Pt is escorted to her bed by two nurses and MHA. Pt unable to sign admit paperwork at this time. Report given to INDY Vazquez in APU.

## 2019-01-19 NOTE — ED NOTES
Pt appears asleep  Will respond to touch, will not awaken when name is called  Resp full and reg    Breath sounds clear maura to all lung fields on auscultation Radial pulses strong and reg Bruising noted to right arm

## 2019-01-19 NOTE — ED NOTES
Alena Betancur is at bedside and is charting on every 15 min observation form  Pt is lying on left side Side rails up times two , bed is in low position

## 2019-01-19 NOTE — ED NOTES
Admit packet faxed to [facility], and [facility].

## 2019-01-19 NOTE — ED NOTES
Charge nurse requested me to give report to [facility],.  Bashir in [facility] given report.
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
## 2019-01-19 NOTE — ASSESSMENT & PLAN NOTE
- H/o fibromyalgia per chart review  - Will assess when pt able to participate with interview  - OTC pain medications PRN for now

## 2019-01-20 PROCEDURE — 93010 EKG 12-LEAD: ICD-10-PCS | Mod: ,,, | Performed by: INTERNAL MEDICINE

## 2019-01-20 PROCEDURE — 93010 ELECTROCARDIOGRAM REPORT: CPT | Mod: ,,, | Performed by: INTERNAL MEDICINE

## 2019-01-20 PROCEDURE — 93005 ELECTROCARDIOGRAM TRACING: CPT

## 2019-01-20 PROCEDURE — 12400001 HC PSYCH SEMI-PRIVATE ROOM

## 2019-01-20 PROCEDURE — 99222 PR INITIAL HOSPITAL CARE,LEVL II: ICD-10-PCS | Mod: ,,, | Performed by: PSYCHIATRY & NEUROLOGY

## 2019-01-20 PROCEDURE — 99222 1ST HOSP IP/OBS MODERATE 55: CPT | Mod: ,,, | Performed by: PSYCHIATRY & NEUROLOGY

## 2019-01-20 NOTE — ASSESSMENT & PLAN NOTE
Pt is a 23yoF w/ PMHx of fibromyalgia and methamphetamine use who presented to [facility] for evaluation of paranoia and agitation.  Pt with worsening behavioral disturbance over past month in the setting of regular meth use.  Pt agitated in the ED requiring sedation with Haldol/Ativan/Benadryl.  Pt unable to participate with interview on initial attempt. Collateral from pt's mother who provided entirety of history above.  DDx at this time includes but is not limited to: methamphetamine intoxication vs substance induced mood/psychotic disorder vs primary psychiatric illness.  UDS upon arrival w/ +amphetamines and +benzos (likely iatrogenic). Due to pt's behavior upon arrival to [facility] ED as well as very concerning collateral, admit [facility] for psychiatric workup and management.     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, unclear severity  Methamphetamine intoxication  R/o substance induced psychosis vs substance induced mood d/o  R/o primary psychotic disorder  R/o cluster B personality disorder     Recommendations:   - Continue PEC for danger to self and grave disability  - Defer initiation of scheduled psychotropic medications to allow washout of amphetamines, will need to assess baseline function  - Will obtain EKG given high likelihood for administration of neuroleptics in the future  - zyprexa 5mg po/im q8hrs PRN for non-redirectable agitation associated with breakthrough psychosis or musa, do not give within one hour of ativan, do not exceed >30mg total daily, offer po first  - Will obtain B12, Folate, TSH, RPR, HIV, lipid panel, HgA1c  - MVI daily, consider CRP/pre-albumin if concern arises regarding potential malnutrition  -Consider repeating CBC due to leukocytosis  -Will continue to monitor vitals q4 due to hypotension following prns in ED

## 2019-01-20 NOTE — NURSING
AARTI Coello MD notified of repeat reading. Patient awake, alert and oriented to person, place, time and situation.

## 2019-01-20 NOTE — NURSING
Patient is still lying in her bed sleeping.  She is still refusing to cooperate with routines.  Still angry if roused.  States she wants to go home.   She did not eat breakfast of lunch.  Stated that she is hungry but will not get up for meals. Monitoring B/P Q 4 hrs.  Refused medication this am.  Thought processes are linear.

## 2019-01-20 NOTE — NURSING
Patient lying in be refusing to get up for breakfast.  Refusing medication.  Swatting at staff when getting vital signs.  Refusing to talk.
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
## 2019-01-20 NOTE — SUBJECTIVE & OBJECTIVE
Patient History           Medical as of 1/20/2019     Past Medical History     Diagnosis Date Comments Source    Borderline personality disorder -- -- Provider    Substance abuse -- -- Provider          Pertinent Negatives     Diagnosis Date Noted Comments Source    History of psychiatric hospitalization 01/19/2019 -- Provider                  Surgical as of 1/20/2019    Past Surgical History: Patient provided no pertinent surgical history.           Family as of 1/20/2019     Problem Relation Name Age of Onset Comments Source    Drug abuse Brother -- -- -- Provider            Tobacco Use as of 1/20/2019     Smoking Status Smoking Start Date Smoking Quit Date Packs/Day Years Used    Current Every Day Smoker -- -- 1.00 --    Types Comments Smokeless Tobacco Status Smokeless Tobacco Quit Date Source     Cigarettes -- Unknown -- Provider            Alcohol Use as of 1/20/2019     Alcohol Use Drinks/Week Alcohol/Week Comments Source    No -- -- -- Provider    Frequency Standard Drinks Binge Drinking Source      Never -- -- Provider             Drug Use as of 1/20/2019     Drug Use Types Frequency Comments Source    Yes  Methamphetamines -- -- Provider            Sexual Activity as of 1/20/2019     Sexually Active Birth Control Partners Comments Source    Yes -- Male -- Provider            Activities of Daily Living as of 1/20/2019     Activities of Daily Living Question Response Comments Source    Patient feels they ought to cut down on drinking/drug use Yes -- Provider    Patient annoyed by others criticizing their drinking/drug use Yes -- Provider    Patient has felt bad or guilty about drinking/drug use Yes -- Provider    Patient has had a drink/used drugs as an eye opener in the AM Yes -- Provider            Social Documentation as of 1/20/2019    None           Occupational as of 1/20/2019    None           Socioeconomic as of 1/20/2019     Marital Status Spouse Name Number of Children Years Education  Education Level Preferred Language Ethnicity Race Source    Single -- -- -- -- English /White White Provider    Financial Resource Strain Food Insecurity: Worry Food Insecurity: Inability Transportation Needs: Medical Transportation Needs: Non-medical       -- -- -- -- --             Pertinent History     Question Response Comments    Lives with -- --    Place in Birth Order -- --    Lives in -- --    Number of Siblings -- --    Raised by -- --    Legal Involvement -- --    Childhood Trauma -- --    Criminal History of -- --    Financial Status -- --    Highest Level of Education -- --    Does patient have access to a firearm? -- --     Service -- --    Primary Leisure Activity -- --    Spirituality -- --        Past Medical History:   Diagnosis Date    Borderline personality disorder     Substance abuse      No past surgical history on file.  Family History     Problem Relation (Age of Onset)    Drug abuse Brother        Tobacco Use    Smoking status: Current Every Day Smoker     Packs/day: 1.00     Types: Cigarettes   Substance and Sexual Activity    Alcohol use: No     Frequency: Never    Drug use: Yes     Types: Methamphetamines    Sexual activity: Yes     Partners: Male     Review of patient's allergies indicates:   Allergen Reactions    Cat/feline products        Current Facility-Administered Medications on File Prior to Encounter   Medication    [COMPLETED] 0.9%  NaCl infusion    [COMPLETED] diphenhydrAMINE injection 25 mg    [COMPLETED] haloperidol lactate injection 5 mg    [COMPLETED] lorazepam injection 2 mg    [COMPLETED] sodium chloride 0.9% bolus 1,000 mL    [COMPLETED] sodium chloride 0.9% bolus 1,000 mL    [COMPLETED] sodium chloride 0.9% bolus 1,000 mL    [DISCONTINUED] acetaminophen tablet 650 mg    [DISCONTINUED] acetaminophen tablet 650 mg    [DISCONTINUED] calcium carbonate 200 mg calcium (500 mg) chewable tablet 1,000 mg    [DISCONTINUED] diphenhydrAMINE  injection 50 mg    [DISCONTINUED] docusate sodium capsule 100 mg    [DISCONTINUED] folic acid tablet 1 mg    [DISCONTINUED] haloperidol lactate injection 5 mg    [DISCONTINUED] lorazepam injection 2 mg    [DISCONTINUED] multivitamin tablet 1 tablet    [DISCONTINUED] nicotine 14 mg/24 hr 1 patch    [DISCONTINUED] OLANZapine injection 10 mg    [DISCONTINUED] OLANZapine tablet 10 mg    [DISCONTINUED] sodium chloride 0.9% bolus 1,000 mL     No current outpatient medications on file prior to encounter.     Psychotherapeutics (From admission, onward)    Start     Stop Route Frequency Ordered    01/19/19 1929  OLANZapine injection 5 mg  (Olanzapine)      -- IM Every 6 hours PRN 01/19/19 1839 01/19/19 1929  OLANZapine tablet 5 mg  (Olanzapine)      -- Oral Every 6 hours PRN 01/19/19 1839        Review of Systems   Unable to perform ROS: Other     Strengths and Liabilities: Strength: Patient is physically healthy., Strength: Patient is stable., Liability: Patient is impulsive., Liability: Patient has poor judgment, Liability: Patient lacks coping skills.    Objective:     Vital Signs (Most Recent):  Temp: 97.5 °F (36.4 °C) (01/19/19 1932)  Pulse: 83 (01/19/19 1932)  Resp: 18 (01/19/19 1932)  BP: (!) 94/54 (01/19/19 1932) Vital Signs (24h Range):  Temp:  [97.5 °F (36.4 °C)-98 °F (36.7 °C)] 97.5 °F (36.4 °C)  Pulse:  [59-83] 83  Resp:  [12-18] 18  SpO2:  [98 %-100 %] 98 %  BP: ()/(48-77) 94/54     Height: 5' (152.4 cm)  Weight: 51.4 kg (113 lb 5.1 oz)  Body mass index is 22.13 kg/m².    No intake or output data in the 24 hours ending 01/20/19 0007    Physical Exam   Psychiatric:   Mental Status Exam:  Appearance: calm, sleeping in bed  Level of Consciousness: sleeping, somnolent  Behavior/Cooperation: uncooperative  Psychomotor: unremarkable   Speech: no spontaneous speech  Language: english, fluid  Orientation: unable to assess   Attention Span/Concentration: unable to assess  Memory: Unable to assess  Mood:  ""unable to ilicit"  Affect: constricted  Thought Process: unable to assess  Associations: unable to assess  Thought Content: reports of SI to collateral, report of paranoia, unable to assess further  Fund of Knowledge: unable to assess  Abstraction: did not assess  Insight: poor  Judgment: poor     Nursing note and vitals reviewed.       Significant Labs:   Last 24 Hours:   Recent Lab Results       01/19/19  1335   01/19/19  0151   01/19/19  0140        Benzodiazepines     Presumptive Positive     Methadone metabolites     Negative     Phencyclidine     Negative     Immature Granulocytes   0.3       Immature Grans (Abs)   0.04  Comment:  Mild elevation in immature granulocytes is non specific and   can be seen in a variety of conditions including stress response,   acute inflammation, trauma and pregnancy. Correlation with other   laboratory and clinical findings is essential.         Acetaminophen (Tylenol), Serum   <3.0  Comment:  Toxic Levels:  Adults (4 hr post-ingestion).........>150 ug/mL  Adults (12 hr post-ingestion)........>40 ug/mL  Peds (2 hr post-ingestion, liquid)...>225 ug/mL         Albumin   4.0       Alcohol, Medical, Serum   <10       Alkaline Phosphatase   76       ALT   10       Amphetamine Screen, Ur     Presumptive Positive     Anion Gap   10       Appearance, UA     Clear     AST   20       Barbiturate Screen, Ur     Negative     Baso #   0.05       Basophil%   0.3       Bilirubin (UA)     Negative     Total Bilirubin   0.2  Comment:  For infants and newborns, interpretation of results should be based  on gestational age, weight and in agreement with clinical  observations.  Premature Infant recommended reference ranges:  Up to 24 hours.............<8.0 mg/dL  Up to 48 hours............<12.0 mg/dL  3-5 days..................<15.0 mg/dL  6-29 days.................<15.0 mg/dL         BUN, Bld   10       Calcium   9.3       Chloride   110       CO2   22       Cocaine (Metab.)     Negative     " Color, UA     Yellow     Creatinine   0.8       Creatinine, Random Ur     203.0  Comment:  The random urine reference ranges provided were established   for 24 hour urine collections.  No reference ranges exist for  random urine specimens.  Correlate clinically.       Differential Method   Automated       eGFR if    >60.0       eGFR if non    >60.0  Comment:  Calculation used to obtain the estimated glomerular filtration  rate (eGFR) is the CKD-EPI equation.          Eos #   0.3       Eosinophil%   1.8       Glucose   93       Glucose, UA     Negative     Gran # (ANC)   11.3       Gran%   75.9       Hematocrit   38.4       Hemoglobin   12.4       Hyaline Casts, UA     1     Ketones, UA     Negative     Leukocytes, UA     Trace     Lymph #   2.3       Lymph%   15.1       MCH   29.2       MCHC   32.3       MCV   91       Microscopic Comment     SEE COMMENT  Comment:  Other formed elements not mentioned in the report are not   present in the microscopic examination.        Mono #   1.0       Mono%   6.6       MPV   9.2       Nitrite, UA     Negative     nRBC   0       Occult Blood UA     2+     Opiate Scrn, Ur     Negative     pH, UA     6.0     Platelets   316       POCT Glucose 83         Potassium   3.5       Preg Test, Ur   Negative       Total Protein   7.4       Protein, UA     Negative  Comment:  Recommend a 24 hour urine protein or a urine   protein/creatinine ratio if globulin induced proteinuria is  clinically suspected.        Acceptable   Yes       RBC   4.24       RBC, UA     4     RDW   12.7       Sodium   142       Specific Gravity, UA     1.020     Specimen UA     Urine, Clean Catch     Squam Epithel, UA     3     Marijuana (THC) Metabolite     Negative     Toxicology Information     SEE COMMENT  Comment:  This screen includes the following classes of drugs at the   listed cut-off:  Benzodiazepines                  200 ng/ml  Methadone                         300 ng/ml  Cocaine metabolite               300 ng/ml  Opiates                          300 ng/ml  Barbiturates                     200 ng/ml  Amphetamines                    1000 ng/ml  Marijuana metabs (THC)            50 ng/ml  Phencyclidine (PCP)               25 ng/ml  High concentrations of Diphenhydramine may cross-react with  Phencyclidine PCP screening immunoassay giving a false   positive result.  High concentrations of Methylenedioxymethamphetamine (MDMA aka  Ectasy) and other structurally similar compounds may cross-   react with the Amphetamine/Methamphetamine screening   immunoassay giving a false positive result.  A metabolite of the anti-HIV drug Sustiva () may cause  false positive results in the Marijuana metabolite (THC)   screening assay.  Note: This exception list includes only more common   interferants in toxicology screen testing.  Because of many   cross-reactantspositive results on toxicology drug screens   should be confirmed whenever results do not correlate with   clinical presentation.  This report is intended for use in clinical monitoring and  management of patients. It is not intended for use in   employment related drug testing.  Because of any cross-reactants, positive results on toxicology  drug screens should be confirmed whenever results do not  correlate with clinical presentation.  Presumptive positive results are unconfirmed and may be used   only for medical purposes.       TSH   1.371       WBC, UA     4     WBC   14.89             Significant Imaging: I have reviewed all pertinent imaging results/findings within the past 24 hours.
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
## 2019-01-20 NOTE — PROGRESS NOTES
01/20/19 0900 01/20/19 1000   [facility] Group Therapy   Group Name Community Reintegration Therapeutic Recreation   Specific Interventions Current Events (tri-bond)   Participation Level None None   Participation Quality Sleeping Sleeping

## 2019-01-20 NOTE — PLAN OF CARE
Patient with low BP reading overnight while sleeping in bed, improved on repeat measure. Responds to nurse, oriented to person, situation, and doesn't report complaints, asking when she can be discharged home per report. Will continue to monitor with q4 vitals. EKG ordered as prior order not released.     Pauline Coello MD  PGY II Psychiatry

## 2019-01-20 NOTE — ASSESSMENT & PLAN NOTE
Pt is a 23yoF w/ PMHx of fibromyalgia and methamphetamine use who presented to [facility] for evaluation of paranoia and agitation.  Pt with worsening behavioral disturbance over past month in the setting of regular meth use.       DDx at this time includes but is not limited to: methamphetamine intoxication vs substance induced mood/psychotic disorder vs primary psychiatric illness.         Recommendations:   - Continue PEC for danger to self and grave disability  - Defer initiation of scheduled psychotropic medications to allow washout of amphetamines, will need to assess baseline function    - zyprexa 5mg po/im q8hrs PRN for non-redirectable agitation associated with breakthrough psychosis or musa, do not give within one hour of ativan, do not exceed >30mg total daily, offer po first  - Ordered B12, Folate, TSH, RPR, HIV, lipid panel, HgA1c

## 2019-01-20 NOTE — NURSING
Patient remains hypotensive. BP reading 77/41 at this time. Pulse 72. Lying in bed. Asymptomatic at this time. Easily aroused. AARTI Coello MD notified. Will perform repeat reading.

## 2019-01-20 NOTE — PLAN OF CARE
01/20/19 1100   [facility] Group Therapy   Group Name Education   Specific Interventions Coping Skills Training   Participation Level None   Participation Quality Refused

## 2019-01-20 NOTE — PLAN OF CARE
Problem: Adult Behavioral Health Plan of Care  Goal: Plan of Care Review  Outcome: Ongoing (interventions implemented as appropriate)  Patient is irritable and angry.  Refusing to talk to staff or Dr Gr.  Cursing and reporting she wants to get out of here.  Denies ever being suicidal.  Refused medication this am.    Goal: Adheres to Safety Considerations for Self and Others    Intervention: Develop and Maintain Individualized Safety Plan  No safety problems at this time.     Goal: Optimized Coping Skills in Response to Life Stressors  Outcome: Ongoing (interventions implemented as appropriate)  No evidence of coping skill at this time  Goal: Develops/Participates in Therapeutic Barry to Support Successful Transition  Outcome: Ongoing (interventions implemented as appropriate)    Does not participate in anything at this time    Problem: Suicidal Behavior  Goal: Suicidal Behavior is Absent or Managed  Outcome: Ongoing (interventions implemented as appropriate)  Patient denies ever being suicidal     Problem: Mental State/Mood Impairment (Psychotic Signs/Symptoms)  Goal: Improved Mental State and Mood (Psychotic Signs/Symptoms)  Outcome: Ongoing (interventions implemented as appropriate)  Patients mood is irritable and angry    Problem: Impaired Control (Excessive Substance Use)  Goal: Participates in Recovery Program (Excessive Substance Use)  Outcome: Ongoing (interventions implemented as appropriate)  Patient refused to participate in recovery program    Problem: Physiologic Impairment (Excessive Substance Use)  Goal: Improved Physiologic Symptoms (Excessive Substance Use)  Patient states she is detoxing.  No specific complaints.  No shakes, No emesis, No complaints of body aches.

## 2019-01-20 NOTE — NURSING
Patient awakens to verbal stimuli.  She was asked to come and eat dinner.  Explained that this would be the last meal today.  She stated no and closed her eyes.

## 2019-01-20 NOTE — NURSING
Patient still in bed.  Got up to urinate.  She was angry and irritable.  Refused to go to treatment team.  Stated she is hungry, she is detox ing, and she wants to go home.  Cursing states she is not listening to this shit.  Explained that she had to get out of bed to eat.  She just returned to her bed and pulled the cover over her head.

## 2019-01-21 PROBLEM — F15.20 AMPHETAMINE USE DISORDER, SEVERE, DEPENDENCE: Chronic | Status: ACTIVE | Noted: 2019-01-21

## 2019-01-21 PROBLEM — F19.950 SUBSTANCE-INDUCED PSYCHOTIC DISORDER WITH DELUSIONS: Status: ACTIVE | Noted: 2019-01-19

## 2019-01-21 PROBLEM — F12.20 CANNABIS USE DISORDER, SEVERE, DEPENDENCE: Chronic | Status: RESOLVED | Noted: 2019-01-21 | Resolved: 2019-01-21

## 2019-01-21 PROBLEM — F12.20 CANNABIS USE DISORDER, SEVERE, DEPENDENCE: Chronic | Status: ACTIVE | Noted: 2019-01-21

## 2019-01-21 PROCEDURE — 99233 SBSQ HOSP IP/OBS HIGH 50: CPT | Mod: ,,, | Performed by: PSYCHIATRY & NEUROLOGY

## 2019-01-21 PROCEDURE — S0166 INJ OLANZAPINE 2.5MG: HCPCS | Performed by: PSYCHIATRY & NEUROLOGY

## 2019-01-21 PROCEDURE — 25000003 PHARM REV CODE 250: Performed by: PSYCHIATRY & NEUROLOGY

## 2019-01-21 PROCEDURE — 99233 PR SUBSEQUENT HOSPITAL CARE,LEVL III: ICD-10-PCS | Mod: ,,, | Performed by: PSYCHIATRY & NEUROLOGY

## 2019-01-21 PROCEDURE — 12400001 HC PSYCH SEMI-PRIVATE ROOM

## 2019-01-21 RX ADMIN — OLANZAPINE 5 MG: 10 INJECTION, POWDER, FOR SOLUTION INTRAMUSCULAR at 05:01
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
## 2019-01-21 NOTE — PLAN OF CARE
Problem: Adult Behavioral Health Plan of Care  Goal: Plan of Care Review  Outcome: Ongoing (interventions implemented as appropriate)  Patient spent in bed all day. Initially refused vital signs. Uncooperative refused meals and vitamins. Was encouraged to drink fluids. Demanding mother to be brought to her. Mood irritable and withdrawn with blanket over her head. Poor eye contact. Grooming unkempt with body odor. Denies SI/Hi/AVH. Made phone call to mother. Will continue to monitor patient.

## 2019-01-21 NOTE — PLAN OF CARE
Problem: Adult Behavioral Health Plan of Care  Goal: Plan of Care Review  Outcome: Ongoing (interventions implemented as appropriate)  Remains uncooperative. Withdrawn to room during the evening. Refuses to come out in milieu. Does not participating in groups or unit activities. Refuses to engage in with staff. Modified visual contact maintained per MD orders.

## 2019-01-21 NOTE — PROGRESS NOTES
Attempted to call patient's boyfriend, Aric Horner () but there was a bad connection. When I tried to call back there was no answer. A voicemail was left. I will make another attempt at a later time.

## 2019-01-21 NOTE — PLAN OF CARE
01/21/19 1500   [facility] Group Therapy   Group Name Education   Specific Interventions Coping Skills Training   Participation Level None   Participation Quality Refused

## 2019-01-21 NOTE — PROGRESS NOTES
01/21/19 1300   [facility] Group Therapy   Group Name Therapeutic Recreation   Participation Level None   Participation Quality Refused;Withdrawn   Affect/Mood Display Irritable

## 2019-01-21 NOTE — ASSESSMENT & PLAN NOTE
Patient with UDS positive for amphetamines on admit  Per collateral from mother, patient with substance abuse since 2014.  Stated that patient has been using meth more frequently over past month  Was brought in to  ER for substance induced psychosis but was discharged from ED.      - Will  on cessation and offer resources for rehab

## 2019-01-21 NOTE — ASSESSMENT & PLAN NOTE
- Patient with episode of hypotension which required rapid response on 1/19 and received 2 L IVFs in ED before being transferred back to [facility]  - Continue to monitor patient's VS  - Encouraged patient to increase PO intake
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
## 2019-01-22 PROCEDURE — 99233 SBSQ HOSP IP/OBS HIGH 50: CPT | Mod: ,,, | Performed by: PSYCHIATRY & NEUROLOGY

## 2019-01-22 PROCEDURE — 25000003 PHARM REV CODE 250: Performed by: PSYCHIATRY & NEUROLOGY

## 2019-01-22 PROCEDURE — 25000003 PHARM REV CODE 250: Performed by: STUDENT IN AN ORGANIZED HEALTH CARE EDUCATION/TRAINING PROGRAM

## 2019-01-22 PROCEDURE — 12400001 HC PSYCH SEMI-PRIVATE ROOM

## 2019-01-22 PROCEDURE — 99233 PR SUBSEQUENT HOSPITAL CARE,LEVL III: ICD-10-PCS | Mod: ,,, | Performed by: PSYCHIATRY & NEUROLOGY

## 2019-01-22 RX ORDER — OLANZAPINE 5 MG/1
5 TABLET, ORALLY DISINTEGRATING ORAL 2 TIMES DAILY
Status: DISCONTINUED | OUTPATIENT
Start: 2019-01-22 | End: 2019-01-23

## 2019-01-22 RX ADMIN — OLANZAPINE 5 MG: 5 TABLET, ORALLY DISINTEGRATING ORAL at 09:01

## 2019-01-22 RX ADMIN — OLANZAPINE 5 MG: 5 TABLET, FILM COATED ORAL at 10:01

## 2019-01-22 NOTE — PROGRESS NOTES
01/21/19 2000   [facility] Group Therapy   Group Name Community Reintegration   Specific Interventions Current Events   Participation Level None

## 2019-01-22 NOTE — NURSING
Pt's asleep in bed upon initial assessment. Pt. refused V/S and refused to come out of her room for nourishments when offered. Respirations even and unlabored. Pt's in no apparent distress. Free from falls/injury. Safety maintained. Continue to monitor.

## 2019-01-22 NOTE — PROGRESS NOTES
Spoke to mother. Patient cannot go live with mother. Mother does not want to enable patient anymore and does not feel patient will do well living with mother. Mother reports the last time patient lived with mother was after treatment and patient just laid on the couch all day and would not get a job or do anything productive. Patient was unable to stay sober after 3 months in treatment. It was suggested for patient to go into sober living after treatment in 2018 but patient refused. Mother hopes that if patient has no where to go patient will agree to an inpatient program. Mother reports patient will have insurance until March of this year. Patient has not been employed since November of 2017.

## 2019-01-22 NOTE — ASSESSMENT & PLAN NOTE
Patient with UDS positive for amphetamines on admit  Per collateral from mother, patient with substance abuse since 2014.  Stated that patient has been using meth more frequently over past month  Was brought in to  ER for substance induced psychosis on 1/18/19 but was discharged from ED.      - Will  on cessation and offer resources for rehab

## 2019-01-22 NOTE — ASSESSMENT & PLAN NOTE
Pt is a 23yoF w/ PMHx of fibromyalgia and methamphetamine use who presented to [facility] for evaluation of paranoia and agitation.  Pt with worsening behavioral disturbance over past month in the setting of regular meth use.       DDx at this time includes but is not limited to: methamphetamine intoxication vs substance induced mood/psychotic disorder vs primary psychiatric illness.         Recommendations:   - Continue PEC for danger to self and grave disability  - Will start Zyprexa zydis 5 mg BID for psychosis/delusions      - zyprexa 5mg po/im q8hrs PRN for non-redirectable agitation associated with breakthrough psychosis or musa, do not give within one hour of ativan, do not exceed >30mg total daily, offer po first    - Ordered B12, Folate, TSH, RPR, HIV, lipid panel, HgA1c.  Patient refused lab draw this morning
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
## 2019-01-22 NOTE — PROGRESS NOTES
01/22/19 1400   [facility] Group Therapy   Group Name Therapeutic Recreation   Participation Level None   Participation Quality Refused   Affect/Mood Display Irritable;Agitated   Cognition Delusional   Patient  refused all groups and was markedly agitated when pressed to attend activities

## 2019-01-22 NOTE — PLAN OF CARE
Problem: Adult Behavioral Health Plan of Care  Goal: Plan of Care Review  Outcome: Ongoing (interventions implemented as appropriate)  Observed asleep at the beginning of the shift. Pt's easily aroused through verbal stimuli. Pt's uncooperative with staff and refused to answer questions or come out of her room. Respirations even and unlabored, pt's in no apparent distress.. No further agitation or hostile behavior noted.  Food tray offered along with nourishments, pt refused. Free from falls/injury safety maintained. Will continue to monitor and offer support.

## 2019-01-22 NOTE — PLAN OF CARE
Problem: Adult Behavioral Health Plan of Care  Goal: Plan of Care Review  Outcome: Ongoing (interventions implemented as appropriate)  Patient dressed in paper gown disheveled, unkempt with body odor. Refused breakfast and MVI. Uncooperative. Meet with treatment team. Got agitated yelling, screaming and verbally abusive. Demanding to be discharge. Irritable and angry. Started banging and kicking front door. Unable to directions. Zyprexa 5 mg po given for agitation.

## 2019-01-22 NOTE — PLAN OF CARE
01/22/19 1500   [facility] Group Therapy   Group Name Education   Specific Interventions Coping Skills Training   Participation Level None   Participation Quality Refused

## 2019-01-22 NOTE — ASSESSMENT & PLAN NOTE
- Patient with episode of hypotension which required rapid response on 1/19 and received 2 L IVFs in ED before being transferred back to [facility]  - Patient's BP remains low but has been stable  - Continue to monitor patient's VS  - Encouraged patient to increase PO intake
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
## 2019-01-22 NOTE — PSYCH
Patient got agitated, cursing loud and screaming at mother during visiting hours. Demanding and wants to go with mother. Started banging the door and kicking. Unable to follow directions.  called and carried patient to her room. Zyprexa 5 mg given IM for agitation. Continuous screaming loud for 10 minutes. Dr. Mendes notified. Will continue to monitor patient.

## 2019-01-23 LAB
CHOLEST SERPL-MCNC: 141 MG/DL
CHOLEST/HDLC SERPL: 3.1 {RATIO}
ESTIMATED AVG GLUCOSE: 100 MG/DL
FOLATE SERPL-MCNC: 8.4 NG/ML
HBA1C MFR BLD HPLC: 5.1 %
HDLC SERPL-MCNC: 45 MG/DL
HDLC SERPL: 31.9 %
HIV1+2 IGG SERPL QL IA.RAPID: NEGATIVE
LDLC SERPL CALC-MCNC: 81 MG/DL
NONHDLC SERPL-MCNC: 96 MG/DL
TRIGL SERPL-MCNC: 75 MG/DL
VIT B12 SERPL-MCNC: 1173 PG/ML

## 2019-01-23 PROCEDURE — 80061 LIPID PANEL: CPT

## 2019-01-23 PROCEDURE — 82607 VITAMIN B-12: CPT

## 2019-01-23 PROCEDURE — 36415 COLL VENOUS BLD VENIPUNCTURE: CPT

## 2019-01-23 PROCEDURE — 83036 HEMOGLOBIN GLYCOSYLATED A1C: CPT

## 2019-01-23 PROCEDURE — 86592 SYPHILIS TEST NON-TREP QUAL: CPT

## 2019-01-23 PROCEDURE — 12400001 HC PSYCH SEMI-PRIVATE ROOM

## 2019-01-23 PROCEDURE — 86703 HIV-1/HIV-2 1 RESULT ANTBDY: CPT

## 2019-01-23 PROCEDURE — 25000003 PHARM REV CODE 250: Performed by: STUDENT IN AN ORGANIZED HEALTH CARE EDUCATION/TRAINING PROGRAM

## 2019-01-23 PROCEDURE — 82746 ASSAY OF FOLIC ACID SERUM: CPT

## 2019-01-23 PROCEDURE — 99232 SBSQ HOSP IP/OBS MODERATE 35: CPT | Mod: ,,, | Performed by: PSYCHIATRY & NEUROLOGY

## 2019-01-23 PROCEDURE — 99232 PR SUBSEQUENT HOSPITAL CARE,LEVL II: ICD-10-PCS | Mod: ,,, | Performed by: PSYCHIATRY & NEUROLOGY

## 2019-01-23 PROCEDURE — 25000003 PHARM REV CODE 250: Performed by: PSYCHIATRY & NEUROLOGY

## 2019-01-23 RX ORDER — HALOPERIDOL 5 MG/ML
5 INJECTION INTRAMUSCULAR ONCE
Status: DISCONTINUED | OUTPATIENT
Start: 2019-01-23 | End: 2019-01-24

## 2019-01-23 RX ORDER — OLANZAPINE 5 MG/1
5 TABLET, ORALLY DISINTEGRATING ORAL NIGHTLY
Status: DISCONTINUED | OUTPATIENT
Start: 2019-01-23 | End: 2019-01-24

## 2019-01-23 RX ADMIN — OLANZAPINE 5 MG: 5 TABLET, ORALLY DISINTEGRATING ORAL at 10:01

## 2019-01-23 RX ADMIN — OLANZAPINE 5 MG: 5 TABLET, ORALLY DISINTEGRATING ORAL at 08:01
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
## 2019-01-23 NOTE — ASSESSMENT & PLAN NOTE
Pt is a 23yoF w/ PMHx of fibromyalgia and methamphetamine use who presented to [facility] for evaluation of paranoia and agitation.  Pt with worsening behavioral disturbance over past month in the setting of regular meth use.       DDx at this time includes but is not limited to: methamphetamine intoxication vs substance induced mood/psychotic disorder vs primary psychiatric illness.         Recommendations:   - Continue CEC for danger to self and grave disability  - Continue Zyprexa zydis 5 mg BID for psychosis.  Will plan to change to 10 mg QHS tomorrow      - zyprexa 5mg po/im q8hrs PRN for non-redirectable agitation associated with breakthrough psychosis or musa, do not give within one hour of ativan, do not exceed >30mg total daily, offer po first    - Ordered B12, Folate, TSH, RPR, HIV, lipid panel, HgA1c.  Patient refused lab draw this morning

## 2019-01-23 NOTE — NURSING
Pt agitated, screaming, not taking redirection. Pt is disrupting the milieu. Dr Jaramillo made aware.
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
## 2019-01-23 NOTE — ASSESSMENT & PLAN NOTE
- H/o fibromyalgia per chart review  - Will assess when pt able to participate with interview  - OTC pain medications PRN for now  -Defer tx at this time due to hypotension

## 2019-01-23 NOTE — PSYCH
Patient in bed all day. Calmer after taking Zyprexa. Ate dinner with peers. Visited with boyfriend with no problem.

## 2019-01-23 NOTE — PLAN OF CARE
Problem: Adult Behavioral Health Plan of Care  Goal: Plan of Care Review  Outcome: Ongoing (interventions implemented as appropriate)  Observed asleep in bed upon initial assessment. Affect flat, mood depressed.Denieed SI/HI, A/V hallucinations. Pt's out of bed, took shower.consumed snacks, then back to her room. Pt's quiet isolative and withdrawn. Took scheduled medications without any problems. No agitation hostility, or psychotic  behavior noted. Appeared asleep throughout the night as noted during frequent rounds. Free from falls/injury. Safety maintained. Continue to monitor.

## 2019-01-23 NOTE — PLAN OF CARE
Problem: Adult Behavioral Health Plan of Care  Goal: Plan of Care Review  Outcome: Ongoing (interventions implemented as appropriate)  Pt remains labile and disruptive to the milieu at times. She has poor insight into situation. She is focused on discharge but refuses to accept responsibility or participate in her treatment. She initially refused labs this morning, was compliant in the afternoon. She became agitated and disruptive in the early afternoon, she screamed and demanded to be discharged. IM haldol/ativan was ordered for pt but she calmed down on her own and was held. Pt appears to be in no distress. She refuses to attend groups, occasionally leaver her rooms for meals. Pt remains free from falls or injuries.
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
## 2019-01-23 NOTE — ASSESSMENT & PLAN NOTE
Patient with UDS positive for amphetamines on admit  Per collateral from mother, patient with substance abuse since 2014.  Stated that patient has been using meth more frequently over past month  Was brought in to  ER for substance induced psychosis on 1/18/19 but was discharged from ED.      - Counseled on cessation  - Patient is not interested in substance abuse treatment following discharge
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
## 2019-01-23 NOTE — ASSESSMENT & PLAN NOTE
Pt is a 23yoF w/ PMHx of fibromyalgia and methamphetamine use who presented to [facility] for evaluation of paranoia and agitation.  Pt with worsening behavioral disturbance over past month in the setting of regular meth use.  Pt agitated in the ED requiring sedation with Haldol/Ativan/Benadryl.  Pt unable to participate with interview on initial attempt.  Spoke with pt's mother who provided entirety of history above.  DDx at this time includes but is not limited to: methamphetamine intoxication vs substance induced mood/psychotic disorder vs primary psychiatric illness.  UDS upon arrival w/ +amphetamines and +benzos (likely iatrogenic).  On account of pt's behavior upon arrival to [facility] ED as well as very concerning collateral, will admit to [facility] for psychiatric workup and management.    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, unclear severity  Methamphetamine intoxication  R/o substance induced psychosis vs substance induced mood d/o  R/o primary psychotic disorder  R/o cluster B personality disorder    Recommendations:   - Continue PEC for danger to self and grave disability  - Defer initiation of scheduled psychotropic medications to allow washout of amphetamines, will need to assess baseline function  - Will obtain EKG given high likelihood for administration of neuroleptics in the future  - zyprexa 10mg po/im q8hrs PRN for non-redirectable agitation associated with breakthrough psychosis or musa, do not give within one hour of ativan, do not exceed >30mg total daily, offer po first  - Will obtain B12, Folate, TSH, RPR, HIV  - MVI daily, consider CRP/pre-albumin if concern arises regarding potential malnutrition      -Defer tx at this time due to hypotension

## 2019-01-23 NOTE — PROGRESS NOTES
01/23/19 1300   [facility] Group Therapy   Group Name Therapeutic Recreation   Participation Level None   Participation Quality Refused;Disruptive
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
## 2019-01-23 NOTE — ASSESSMENT & PLAN NOTE
- Patient with episode of hypotension which required rapid response on 1/19 and received 2 L IVFs in ED before being transferred back to APU  - Patient's BP remains low but has been stable. Most recent 100/64.  She refused VS this AM  - Continue to monitor patient's VS

## 2019-01-23 NOTE — PLAN OF CARE
01/23/19 1500   [facility] Group Therapy   Group Name Education   Specific Interventions Coping Skills Training   Participation Level None   Participation Quality Refused

## 2019-01-24 LAB — RPR SER QL: NORMAL

## 2019-01-24 PROCEDURE — 25000003 PHARM REV CODE 250: Performed by: PSYCHIATRY & NEUROLOGY

## 2019-01-24 PROCEDURE — 12400001 HC PSYCH SEMI-PRIVATE ROOM

## 2019-01-24 PROCEDURE — 99232 PR SUBSEQUENT HOSPITAL CARE,LEVL II: ICD-10-PCS | Mod: ,,, | Performed by: PSYCHIATRY & NEUROLOGY

## 2019-01-24 PROCEDURE — 99232 SBSQ HOSP IP/OBS MODERATE 35: CPT | Mod: ,,, | Performed by: PSYCHIATRY & NEUROLOGY

## 2019-01-24 RX ORDER — OLANZAPINE 10 MG/1
10 TABLET, ORALLY DISINTEGRATING ORAL NIGHTLY
Status: DISCONTINUED | OUTPATIENT
Start: 2019-01-24 | End: 2019-01-25

## 2019-01-24 RX ADMIN — OLANZAPINE 10 MG: 10 TABLET, ORALLY DISINTEGRATING ORAL at 08:01

## 2019-01-24 NOTE — PROGRESS NOTES
01/24/19 0900 01/24/19 1100 01/24/19 1200   [facility] Group Therapy   Group Name Community Reintegration Education Therapeutic Recreation   Specific Interventions --  Guided Imagery/Relaxation Skilled Activity Crafts   Participation Level --  --  Active;Attentive   Participation Quality Refused Refused Cooperative   Insight/Motivation --  --  Improved;Applies New Skills   Affect/Mood Display Irritable Irritable Appropriate   Cognition --  --  Alert

## 2019-01-24 NOTE — NURSING
Patient anxious and very dramatic.  [place] on the phone with her mother stating that this place was draining the last bit of sanity she had left.  Crying on the phone.  Patient tends to isolate on the unit.  Refused her am vitamin.  No loud outburst today.  Mood improving. Eating and sleeping well.

## 2019-01-24 NOTE — ASSESSMENT & PLAN NOTE
- Patient with episode of hypotension which required rapid response on 1/19 and received 2 L IVFs in ED before being transferred back to [facility]  - Patient's BP elevated this AM at 144/85  - Continue to monitor patient's VS

## 2019-01-24 NOTE — ASSESSMENT & PLAN NOTE
Pt is a 23yoF w/ PMHx of fibromyalgia and methamphetamine use who presented to [facility] for evaluation of paranoia and agitation.  Pt with worsening behavioral disturbance over past month in the setting of regular meth use.       DDx at this time includes but is not limited to: methamphetamine intoxication vs substance induced mood/psychotic disorder vs primary psychiatric illness.         Recommendations:   - Continue CEC for danger to self and grave disability  - Change Zyprexa zydis to 10 mg QHS      - zyprexa 5mg po/im q8hrs PRN for non-redirectable agitation associated with breakthrough psychosis or musa, do not give within one hour of ativan, do not exceed >30mg total daily, offer po first    - Obtained B12, Folate, TSH, RPR, HIV, lipid panel, HgA1c yesterday afternoon.   - Reviewed results. RPR pending, otherwise within normal limits

## 2019-01-24 NOTE — PROGRESS NOTES
01/23/19 2000   [facility] Group Therapy   Group Name Stress Management   Specific Interventions Coping Skills Training   Participation Level None   Participation Quality Refused

## 2019-01-24 NOTE — PLAN OF CARE
"Problem: Adult Behavioral Health Plan of Care  Goal: Plan of Care Review  Outcome: Ongoing (interventions implemented as appropriate)  Observed awake and alert. Affect flat, mood depressed. Denied SI/HI, AV hallucinations. No agitation, psychotic, or hostile behavior noted. Took scheduled medications without any problems, no peer interaction noted. Pt. tearfully stated, " I want to go home". Appeared asleep throughout the night as noted during frequent rounds. Free from falls/injury. Safety maintained. Continue to monitor.      "

## 2019-01-24 NOTE — PLAN OF CARE
Problem: Adult Behavioral Health Plan of Care  Goal: Plan of Care Review  Outcome: Ongoing (interventions implemented as appropriate)  Patient was able to go to treatment team and discuss her plan of care without getting angry and blowing up.   Mood is improving.  Denies any current detox symptoms.  Focused on discharge.   Goal: Adheres to Safety Considerations for Self and Others  Outcome: Ongoing (interventions implemented as appropriate)  Adheres to safety rules and routines.   Goal: Optimized Coping Skills in Response to Life Stressors  Outcome: Ongoing (interventions implemented as appropriate)  Limited use of coping skills  Goal: Develops/Participates in Therapeutic [place] to Support Successful Transition  Outcome: Ongoing (interventions implemented as appropriate)  Minimally participate in therapeutic alliance.      Problem: Suicidal Behavior  Goal: Suicidal Behavior is Absent or Managed  Outcome: Ongoing (interventions implemented as appropriate)  Denies any current suicidal ideations    Problem: Mental State/Mood Impairment (Psychotic Signs/Symptoms)  Goal: Improved Mental State and Mood (Psychotic Signs/Symptoms)  Outcome: Ongoing (interventions implemented as appropriate)  Patient Thought processes are linear and goal directed.   Mood is labile with periods of irritablity.      Problem: Impaired Control (Excessive Substance Use)  Goal: Participates in Recovery Program (Excessive Substance Use)  Outcome: Ongoing (interventions implemented as appropriate)  Patient show poor insight into her substance abuse issues.      Problem: Physiologic Impairment (Excessive Substance Use)  Goal: Improved Physiologic Symptoms (Excessive Substance Use)  Denies any current detox symptoms.
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
## 2019-01-24 NOTE — ASSESSMENT & PLAN NOTE
Patient with UDS positive for amphetamines on admit  Per collateral from mother, patient with substance abuse since 2014.  Stated that patient has been using meth more frequently over past month  Was brought in to  ER for substance induced psychosis on 1/18/19 but was discharged from ED.      - Counseled on cessation  - Patient reports that her mother wanted her to go to [facility] after hospitalization  - Patient is not interested in substance abuse treatment following discharge
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
## 2019-01-24 NOTE — PLAN OF CARE
01/24/19 1530   [facility] Group Therapy   Group Name Medication   Participation Level None   Participation Quality Refused

## 2019-01-25 PROCEDURE — 25000003 PHARM REV CODE 250: Performed by: STUDENT IN AN ORGANIZED HEALTH CARE EDUCATION/TRAINING PROGRAM

## 2019-01-25 PROCEDURE — S4991 NICOTINE PATCH NONLEGEND: HCPCS | Performed by: PSYCHIATRY & NEUROLOGY

## 2019-01-25 PROCEDURE — 25000003 PHARM REV CODE 250: Performed by: PSYCHIATRY & NEUROLOGY

## 2019-01-25 PROCEDURE — 99232 SBSQ HOSP IP/OBS MODERATE 35: CPT | Mod: ,,, | Performed by: PSYCHIATRY & NEUROLOGY

## 2019-01-25 PROCEDURE — 12400001 HC PSYCH SEMI-PRIVATE ROOM

## 2019-01-25 PROCEDURE — 99232 PR SUBSEQUENT HOSPITAL CARE,LEVL II: ICD-10-PCS | Mod: ,,, | Performed by: PSYCHIATRY & NEUROLOGY

## 2019-01-25 RX ORDER — HYDROXYZINE PAMOATE 50 MG/1
50 CAPSULE ORAL EVERY 6 HOURS PRN
Status: DISCONTINUED | OUTPATIENT
Start: 2019-01-25 | End: 2019-01-28 | Stop reason: HOSPADM

## 2019-01-25 RX ORDER — OLANZAPINE 10 MG/1
10 TABLET ORAL NIGHTLY
Status: DISCONTINUED | OUTPATIENT
Start: 2019-01-25 | End: 2019-01-28 | Stop reason: HOSPADM

## 2019-01-25 RX ADMIN — NICOTINE 1 PATCH: 14 PATCH, EXTENDED RELEASE TRANSDERMAL at 10:01

## 2019-01-25 RX ADMIN — OLANZAPINE 10 MG: 10 TABLET, FILM COATED ORAL at 08:01

## 2019-01-25 NOTE — PROGRESS NOTES
"Spoke with patient's boyfriend, Aric Horner. Mr. Horner reports patient was "100% better last night" when he came to visit patient. Patient will be able to go back to live with boyfriend after discharging. Mr. Horner feels patient is ready for discharge.     Spoke with patient's mother and she does not feel patient should be staying with the boyfriend. The boyfriend told the mother yesterday that his drug of choice is heroine and he has been trying to get of it for a while. Mother wants patient to go straight to rehab. Mother found a place that has a bed [facility].    "
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
## 2019-01-25 NOTE — ASSESSMENT & PLAN NOTE
Pt is a 23yoF w/ PMHx of fibromyalgia and methamphetamine use who presented to [facility] for evaluation of paranoia and agitation.  Pt with worsening behavioral disturbance over past month in the setting of regular meth use.       DDx at this time includes but is not limited to: methamphetamine intoxication vs substance induced mood/psychotic disorder vs primary psychiatric illness.       Recommendations:   - Continue CEC for danger to self and grave disability  - Switch zydis to zyprexa 10mg PO qHS    - zyprexa 5mg po/im q8hrs PRN for non-redirectable agitation associated with breakthrough psychosis or musa, do not give within one hour of ativan, do not exceed >30mg total daily, offer po first    - Obtained B12, Folate, TSH, RPR, HIV, lipid panel, HgA1c yesterday afternoon.   - Reviewed results. All within normal limits
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
## 2019-01-25 NOTE — PLAN OF CARE
Problem: Adult Behavioral Health Plan of Care  Goal: Plan of Care Review  Outcome: Ongoing (interventions implemented as appropriate)  Patient quiet and isolative. Denies SI/HI/AVH. Focused on discharge. Disappointed not able to go home. No delusions noted. Refused vitamin. Eating no problem

## 2019-01-25 NOTE — SUBJECTIVE & OBJECTIVE
Interval History: see hospital course    Family History     Problem Relation (Age of Onset)    Drug abuse Brother        Tobacco Use    Smoking status: Current Every Day Smoker     Packs/day: 1.00     Types: Cigarettes   Substance and Sexual Activity    Alcohol use: No     Frequency: Never    Drug use: Yes     Types: Methamphetamines    Sexual activity: Yes     Partners: Male     Psychotherapeutics (From admission, onward)    Start     Stop Route Frequency Ordered    01/24/19 2100  olanzapine zydis disintegrating tablet 10 mg      -- Oral Nightly 01/24/19 0820    01/19/19 1929  OLANZapine injection 5 mg  (Olanzapine)      -- IM Every 6 hours PRN 01/19/19 1839 01/19/19 1929  OLANZapine tablet 5 mg  (Olanzapine)      -- Oral Every 6 hours PRN 01/19/19 1839           Review of Systems   Respiratory: Negative for shortness of breath.    Cardiovascular: Negative for chest pain.   Gastrointestinal: Negative for abdominal pain, nausea and vomiting.     Objective:     Vital Signs (Most Recent):  Temp: 98 °F (36.7 °C) (01/25/19 0805)  Pulse: 93 (01/25/19 0805)  Resp: 16 (01/25/19 0805)  BP: 111/68 (01/25/19 0805) Vital Signs (24h Range):  Temp:  [97.8 °F (36.6 °C)-98.4 °F (36.9 °C)] 98 °F (36.7 °C)  Pulse:  [80-99] 93  Resp:  [16-18] 16  BP: (111-120)/(57-73) 111/68     Height: 5' (152.4 cm)  Weight: 51.4 kg (113 lb 5.1 oz)  Body mass index is 22.13 kg/m².    No intake or output data in the 24 hours ending 01/25/19 1029    Physical Exam   Psychiatric:   CONSTITUTIONAL  General Appearance: age appropriate, dressed in scrubs with T-shirt, disheveled    MUSCULOSKELETAL  Muscle Strength and Tone: Normal strength and tone. No focal numbness or weakness.  Abnormal Involuntary Movements: No abnormal involuntary movements, appreciated.  Gait and Station: Ambulates with steady gait, without assistance.    PSYCHIATRIC   Level of Consciousness: awake  Orientation: grossly intact  Grooming: dressed in scrubs with [employer] T-shirt  ", poor hygiene, disheveled  Behavior/Cooperation: calmer, more cooperative. Remains focused on discharge  Psychomotor: within normal limits  Speech: conversational rate, tone, volume  Language: Fluent, answers questions appropriately, follows commands.  Mood: "tired"  Affect: mood-congruent, constricted  Thought Process: goal-directed  Associations: intact  Thought Content: Denies any SI/HI or AH/VH.  No voiced delusions  Memory: Grossly intact  Attention: intact  Fund of Knowledge: Intact  Insight: impaired into issues with substance abuse  Judgment: improving         Significant Labs: All pertinent labs within the past 24 hours have been reviewed.    Significant Imaging: I have reviewed all pertinent imaging results/findings within the past 24 hours.  "

## 2019-01-25 NOTE — PROGRESS NOTES
01/25/19 1400   [facility] Group Therapy   Group Name Therapeutic Recreation   Specific Interventions (movie social)   Participation Level Active   Participation Quality Cooperative   Insight/Motivation Good   Affect/Mood Display Appropriate   Cognition Alert

## 2019-01-25 NOTE — PLAN OF CARE
01/25/19 1300   [facility] Group Therapy   Group Name Medication   Specific Interventions (with )   Participation Level None   Participation Quality Refused

## 2019-01-25 NOTE — PROGRESS NOTES
Cara from [facility] called and will have someone come by to talk to patient about their inpatient program.    Mother will be in on Monday for a family meeting at 10am

## 2019-01-25 NOTE — ASSESSMENT & PLAN NOTE
Patient with UDS positive for amphetamines on admit  Per collateral from mother, patient with substance abuse since 2014.  Stated that patient has been using meth more frequently over past month  Was brought in to  ER for substance induced psychosis on 1/18/19 but was discharged from ED.      - Counseled on cessation  - Patient reports that her mother wanted her to go to [facility] after hospitalization  - Patient is not interested in substance abuse treatment following discharge  - Family meeting planned for monday
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
## 2019-01-26 PROCEDURE — 25000003 PHARM REV CODE 250: Performed by: STUDENT IN AN ORGANIZED HEALTH CARE EDUCATION/TRAINING PROGRAM

## 2019-01-26 PROCEDURE — 99232 SBSQ HOSP IP/OBS MODERATE 35: CPT | Mod: ,,, | Performed by: PSYCHIATRY & NEUROLOGY

## 2019-01-26 PROCEDURE — 12400001 HC PSYCH SEMI-PRIVATE ROOM

## 2019-01-26 PROCEDURE — 99232 PR SUBSEQUENT HOSPITAL CARE,LEVL II: ICD-10-PCS | Mod: ,,, | Performed by: PSYCHIATRY & NEUROLOGY

## 2019-01-26 RX ADMIN — OLANZAPINE 10 MG: 10 TABLET, FILM COATED ORAL at 08:01

## 2019-01-26 NOTE — PLAN OF CARE
Problem: Adult Behavioral Health Plan of Care  Goal: Plan of Care Review  Outcome: Ongoing (interventions implemented as appropriate)  The patient did not attend group this evening and remains against attending rehab once discharged. She continues to minimize and denies the severity of her substance abuse. The patient continues to display poor insight and judgement. She was medication compliant with her evening medications. No management issues overnight. The patient's behavior was appropriate on the unit though somewhat withdrawn and guarded. She denies suicidal and homicidal ideation at this time. Her thought process appears linear and appropriate. No falls overnight. Will continue to monitor.

## 2019-01-26 NOTE — PLAN OF CARE
Problem: Adult Behavioral Health Plan of Care  Goal: Plan of Care Review  Outcome: Ongoing (interventions implemented as appropriate)  Patient stayed in bed all day until 3 pm. Showered and dressed. Calm and pleasant. Denies SI/hi/AVH. Isolative and withdrawn. Focused on discharge. Denies any physical complaints.

## 2019-01-26 NOTE — PROGRESS NOTES
01/26/19 0900 01/26/19 1000 01/26/19 1100   [facility] Group Therapy   Group Name Community Reintegration Mental Awareness Medication   Specific Interventions Current Events Cognitive Stimulation Training --    Participation Level None None None       01/26/19 1115   [facility] Group Therapy   Group Name Stress Management   Specific Interventions Guided Imagery/Relaxation   Participation Level None

## 2019-01-27 PROCEDURE — 99232 SBSQ HOSP IP/OBS MODERATE 35: CPT | Mod: ,,, | Performed by: PSYCHIATRY & NEUROLOGY

## 2019-01-27 PROCEDURE — 12400001 HC PSYCH SEMI-PRIVATE ROOM

## 2019-01-27 PROCEDURE — 99232 PR SUBSEQUENT HOSPITAL CARE,LEVL II: ICD-10-PCS | Mod: ,,, | Performed by: PSYCHIATRY & NEUROLOGY

## 2019-01-27 PROCEDURE — 25000003 PHARM REV CODE 250: Performed by: STUDENT IN AN ORGANIZED HEALTH CARE EDUCATION/TRAINING PROGRAM

## 2019-01-27 RX ADMIN — OLANZAPINE 10 MG: 10 TABLET, FILM COATED ORAL at 08:01

## 2019-01-27 NOTE — SUBJECTIVE & OBJECTIVE
"Interval History: see hospital course    Family History     Problem Relation (Age of Onset)    Drug abuse Brother        Tobacco Use    Smoking status: Current Every Day Smoker     Packs/day: 1.00     Types: Cigarettes   Substance and Sexual Activity    Alcohol use: No     Frequency: Never    Drug use: Yes     Types: Methamphetamines    Sexual activity: Yes     Partners: Male     Psychotherapeutics (From admission, onward)    Start     Stop Route Frequency Ordered    01/25/19 2100  OLANZapine tablet 10 mg      02/08 2059 Oral Nightly 01/25/19 1030    01/19/19 1929  OLANZapine injection 5 mg  (Olanzapine)      -- IM Every 6 hours PRN 01/19/19 1839 01/19/19 1929  OLANZapine tablet 5 mg  (Olanzapine)      -- Oral Every 6 hours PRN 01/19/19 1839           Review of Systems   Gastrointestinal: Negative for abdominal pain.   Neurological: Negative for headaches.     Objective:     Vital Signs (Most Recent):  Temp: 98.3 °F (36.8 °C) (01/27/19 0738)  Pulse: 86 (01/27/19 0738)  Resp: 16 (01/27/19 0738)  BP: (!) 92/52 (01/27/19 0738) Vital Signs (24h Range):  Temp:  [97.4 °F (36.3 °C)-98.3 °F (36.8 °C)] 98.3 °F (36.8 °C)  Pulse:  [86-87] 86  Resp:  [16-18] 16  BP: ()/(50-52) 92/52     Height: 5' (152.4 cm)  Weight: 51.4 kg (113 lb 5.1 oz)  Body mass index is 22.13 kg/m².    No intake or output data in the 24 hours ending 01/27/19 1016    Physical Exam   Psychiatric:   CONSTITUTIONAL  General Appearance: age appropriate, laying in bed under covers     PSYCHIATRIC   Level of Consciousness: drowsy   Orientation: grossly intact  Behavior/Cooperation: cooperative   Psychomotor: within normal limits  Speech: conversational rate, tone, volume  Language: Fluent, answers questions appropriately, follows commands.  Mood: "alright"  Affect: constricted  Thought Process: goal-directed toward discharge   Associations: intact  Thought Content: Denies any SI/HI or AH/VH.  No voiced delusions  Memory: Grossly intact  Attention: " intact  Fund of Knowledge: Intact  Insight: impaired into issues with substance abuse  Judgment: appropriate for setting            Significant Labs: All pertinent labs within the past 24 hours have been reviewed.    Significant Imaging: I have reviewed all pertinent imaging results/findings within the past 24 hours.

## 2019-01-27 NOTE — ASSESSMENT & PLAN NOTE
Pt is a 23yoF w/ PMHx of fibromyalgia and methamphetamine use who presented to [facility] for evaluation of paranoia and agitation.  Pt with worsening behavioral disturbance over past month in the setting of regular meth use.       DDx at this time includes but is not limited to: methamphetamine intoxication vs substance induced mood/psychotic disorder vs primary psychiatric illness.       Recommendations:   - Continue CEC for danger to self and grave disability  - Switch zydis to zyprexa 10mg PO qHS; tolerating without issue     - zyprexa 5mg po/im q8hrs PRN for non-redirectable agitation associated with breakthrough psychosis or musa, do not give within one hour of ativan, do not exceed >30mg total daily, offer po first    - Obtained B12, Folate, TSH, RPR, HIV, lipid panel, HgA1c yesterday afternoon.   - Reviewed results. All within normal limits
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
## 2019-01-27 NOTE — PROGRESS NOTES
01/27/19 1145   [facility] Group Therapy   Group Name Therapeutic Recreation   Specific Interventions Guided Imagery/Relaxation   Participation Level Supportive   Participation Quality Cooperative   Insight/Motivation Good   Affect/Mood Display Appropriate   Cognition Oriented      Alert/Cooperative/Awake

## 2019-01-27 NOTE — PROGRESS NOTES
01/27/19 0930   [facility] Group Therapy   Group Name Therapeutic Recreation   Specific Interventions Cognitive Stimulation Training   Participation Level None
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
## 2019-01-27 NOTE — PLAN OF CARE
Problem: Adult Behavioral Health Plan of Care  Goal: Plan of Care Review  Outcome: Ongoing (interventions implemented as appropriate)  Patient denies SI/HI. Denies hearing voices. Endorses mood better. Calm and cooperative. Attended unit activities. Looking forward to be discharge tomorrow.

## 2019-01-27 NOTE — SUBJECTIVE & OBJECTIVE
Interval History: see hospital course    Family History     Problem Relation (Age of Onset)    Drug abuse Brother        Tobacco Use    Smoking status: Current Every Day Smoker     Packs/day: 1.00     Types: Cigarettes   Substance and Sexual Activity    Alcohol use: No     Frequency: Never    Drug use: Yes     Types: Methamphetamines    Sexual activity: Yes     Partners: Male     Psychotherapeutics (From admission, onward)    Start     Stop Route Frequency Ordered    01/25/19 2100  OLANZapine tablet 10 mg      02/08 2059 Oral Nightly 01/25/19 1030    01/19/19 1929  OLANZapine injection 5 mg  (Olanzapine)      -- IM Every 6 hours PRN 01/19/19 1839 01/19/19 1929  OLANZapine tablet 5 mg  (Olanzapine)      -- Oral Every 6 hours PRN 01/19/19 1839           Review of Systems     Objective:     Vital Signs (Most Recent):  Temp: 97.4 °F (36.3 °C) (01/26/19 1917)  Pulse: 87 (01/26/19 1917)  Resp: 18 (01/26/19 1917)  BP: (!) 124/50 (01/26/19 1917) Vital Signs (24h Range):  Temp:  [97.4 °F (36.3 °C)-97.9 °F (36.6 °C)] 97.4 °F (36.3 °C)  Pulse:  [69-87] 87  Resp:  [16-18] 18  BP: ()/(43-50) 124/50     Height: 5' (152.4 cm)  Weight: 51.4 kg (113 lb 5.1 oz)  Body mass index is 22.13 kg/m².    No intake or output data in the 24 hours ending 01/26/19 2020    Physical Exam   Psychiatric:   CONSTITUTIONAL  General Appearance: age appropriate, dressed in scrubs with T-shirt, disheveled    MUSCULOSKELETAL  Muscle Strength and Tone: Normal strength and tone. No focal numbness or weakness.  Abnormal Involuntary Movements: No abnormal involuntary movements, appreciated.  Gait and Station: Ambulates with steady gait, without assistance.    PSYCHIATRIC   Level of Consciousness: alert  Orientation: grossly intact  Grooming: dressed in scrubs with [employer] T-shirt , poor hygiene, disheveled  Behavior/Cooperation: cooperative   Psychomotor: within normal limits  Speech: conversational rate, tone, volume  Language: Fluent, answers  "questions appropriately, follows commands.  Mood: "ready to leave"  Affect: constricted  Thought Process: goal-directed toward discharge   Associations: intact  Thought Content: Denies any SI/HI or AH/VH.  No voiced delusions  Memory: Grossly intact  Attention: intact  Fund of Knowledge: Intact  Insight: impaired into issues with substance abuse  Judgment: appropriate for setting            Significant Labs: All pertinent labs within the past 24 hours have been reviewed.    Significant Imaging: I have reviewed all pertinent imaging results/findings within the past 24 hours.  "

## 2019-01-27 NOTE — PLAN OF CARE
01/27/19 1130   [facility] Group Therapy   Group Name Education   Participation Level Appropriate   Participation Quality Cooperative   Insight/Motivation Improved   Affect/Mood Display Appropriate   Cognition Alert

## 2019-01-27 NOTE — ASSESSMENT & PLAN NOTE
Patient with UDS positive for amphetamines on admit  Per collateral from mother, patient with substance abuse since 2014.  Stated that patient has been using meth more frequently over past month  Was brought in to  ER for substance induced psychosis on 1/18/19 but was discharged from ED.      - Counseled on cessation  - Patient reports that her mother wanted her to go to [facility] after hospitalization  - Patient is not interested in substance abuse treatment following discharge; addressed on 1/26 and remains uninterested   - Family meeting planned for monday

## 2019-01-27 NOTE — ASSESSMENT & PLAN NOTE
Pt is a 23yoF w/ PMHx of fibromyalgia and methamphetamine use who presented to [facility] for evaluation of paranoia and agitation.  Pt with worsening behavioral disturbance over past month in the setting of regular meth use.       DDx at this time includes but is not limited to: methamphetamine intoxication vs substance induced mood/psychotic disorder vs primary psychiatric illness.       Recommendations:   - Continue CEC for danger to self and grave disability  - Continue zydis to zyprexa 10mg PO qHS; tolerating without issue     - zyprexa 5mg po/im q8hrs PRN for non-redirectable agitation associated with breakthrough psychosis or musa, do not give within one hour of ativan, do not exceed >30mg total daily, offer po first    - Obtained B12, Folate, TSH, RPR, HIV, lipid panel, HgA1c yesterday afternoon.   - Reviewed results. All within normal limits

## 2019-01-28 VITALS
BODY MASS INDEX: 22.25 KG/M2 | SYSTOLIC BLOOD PRESSURE: 110 MMHG | HEART RATE: 104 BPM | WEIGHT: 113.31 LBS | DIASTOLIC BLOOD PRESSURE: 70 MMHG | RESPIRATION RATE: 18 BRPM | TEMPERATURE: 98 F | HEIGHT: 60 IN

## 2019-01-28 PROBLEM — I95.9 HYPOTENSION: Status: RESOLVED | Noted: 2019-01-19 | Resolved: 2019-01-28

## 2019-01-28 PROCEDURE — 99239 PR HOSPITAL DISCHARGE DAY,>30 MIN: ICD-10-PCS | Mod: 25,ICN,, | Performed by: PSYCHIATRY & NEUROLOGY

## 2019-01-28 PROCEDURE — 90847 PR FAMILY PSYCHOTHERAPY W/ PT, 50 MIN: ICD-10-PCS | Mod: ICN,,, | Performed by: PSYCHIATRY & NEUROLOGY

## 2019-01-28 PROCEDURE — 25000003 PHARM REV CODE 250: Performed by: PSYCHIATRY & NEUROLOGY

## 2019-01-28 PROCEDURE — 90847 FAMILY PSYTX W/PT 50 MIN: CPT | Mod: ICN,,, | Performed by: PSYCHIATRY & NEUROLOGY

## 2019-01-28 PROCEDURE — 99239 HOSP IP/OBS DSCHRG MGMT >30: CPT | Mod: 25,ICN,, | Performed by: PSYCHIATRY & NEUROLOGY

## 2019-01-28 RX ORDER — OLANZAPINE 10 MG/1
10 TABLET ORAL NIGHTLY
Qty: 30 TABLET | Refills: 0 | Status: SHIPPED | OUTPATIENT
Start: 2019-01-28 | End: 2020-01-28

## 2019-01-28 RX ORDER — OLANZAPINE 10 MG/1
10 TABLET ORAL NIGHTLY
Qty: 30 TABLET | Refills: 0 | OUTPATIENT
Start: 2019-01-28 | End: 2019-01-28

## 2019-01-28 RX ADMIN — THERA TABS 1 TABLET: TAB at 08:01

## 2019-01-28 NOTE — NURSING
Pt is given detailed d/c instructions on follow treatment and medications. Pt is given signed MD scripts. Pt verbalizes understanding of all instructions. Pt denies SI/HI/AV hallucinations. Pt is calm and compliant, no signs of distress. Pt signs form verifying that she has received all of her belongings. Pt is d/c to vehicle sent by [facility].

## 2019-01-28 NOTE — ASSESSMENT & PLAN NOTE
Patient with UDS positive for amphetamines on admit  Per collateral from mother, patient with substance abuse since 2014.  Stated that patient has been using meth more frequently over past month  Was brought in to  ER for substance induced psychosis on 1/18/19 but was discharged from ED.      - Counseled on cessation  - Patient's mother and representative from [facility] residential rehab presented for Family meeting on 1/28/19.  Patient agreed to go directly to [facility] following discharge
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
## 2019-01-28 NOTE — ASSESSMENT & PLAN NOTE
- H/o fibromyalgia per chart review    - Continue over the counter medications for pain relief

## 2019-01-28 NOTE — ASSESSMENT & PLAN NOTE
Pt is a 23yoF w/ PMHx of fibromyalgia and methamphetamine use who presented to [facility] for evaluation of paranoia and agitation.       DDx at this time includes but is not limited to: methamphetamine intoxication vs substance induced mood/psychotic disorder vs primary psychiatric illness.       Recommendations:   - Rescind CEC at this time.  Patient's paranoia has resolved and she denies experiencing any thoughts of wanting to harm herself.  Denies making statement about wanting to commit suicide prior to admission and states that her mother took her words out of context. Patient no longer meets criteria for involuntary psychiatric hospitalization as she is not a danger to herself or others and is not gravely disabled.    - Patient will be discharged to [facility]    - Continue Zyprexa 10 mg QHS
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
## 2019-03-12 NOTE — ASSESSMENT & PLAN NOTE
- H/o fibromyalgia per chart review  - Will assess when pt able to participate with interview  - OTC pain medications PRN for now          No

## 2020-02-26 NOTE — ED NOTES
Bed: 20  Expected date:   Expected time:   Means of arrival:   Comments:  [facility]   Please assist her to schedule an appointment.

## 2020-09-25 ENCOUNTER — HOSPITAL ENCOUNTER (EMERGENCY)
Facility: HOSPITAL | Age: 25
Discharge: HOME | End: 2020-09-26
Attending: EMERGENCY MEDICINE
Payer: MEDICAID

## 2020-09-25 VITALS
TEMPERATURE: 99 F | SYSTOLIC BLOOD PRESSURE: 121 MMHG | OXYGEN SATURATION: 98 % | DIASTOLIC BLOOD PRESSURE: 84 MMHG | HEART RATE: 119 BPM | RESPIRATION RATE: 22 BRPM

## 2020-09-25 DIAGNOSIS — Z00.8 MEDICAL CLEARANCE FOR PSYCHIATRIC ADMISSION: ICD-10-CM

## 2020-09-25 DIAGNOSIS — F32.A DEPRESSION, UNSPECIFIED DEPRESSION TYPE: Primary | ICD-10-CM

## 2020-09-25 LAB
ALBUMIN SERPL BCP-MCNC: 4.5 G/DL (ref 3.5–5.2)
ALP SERPL-CCNC: 67 U/L (ref 55–135)
ALT SERPL W/O P-5'-P-CCNC: 18 U/L (ref 10–44)
ANION GAP SERPL CALC-SCNC: 14 MMOL/L (ref 8–16)
APAP SERPL-MCNC: <3 UG/ML (ref 10–20)
AST SERPL-CCNC: 23 U/L (ref 10–40)
B-HCG UR QL: NEGATIVE
BACTERIA #/AREA URNS AUTO: ABNORMAL /HPF
BASOPHILS # BLD AUTO: 0.05 K/UL (ref 0–0.2)
BASOPHILS NFR BLD: 0.5 % (ref 0–1.9)
BILIRUB SERPL-MCNC: 0.4 MG/DL (ref 0.1–1)
BILIRUB UR QL STRIP: NEGATIVE
BUN SERPL-MCNC: 4 MG/DL (ref 6–20)
CALCIUM SERPL-MCNC: 9.5 MG/DL (ref 8.7–10.5)
CHLORIDE SERPL-SCNC: 105 MMOL/L (ref 95–110)
CLARITY UR REFRACT.AUTO: ABNORMAL
CO2 SERPL-SCNC: 22 MMOL/L (ref 23–29)
COLOR UR AUTO: YELLOW
CREAT SERPL-MCNC: 0.9 MG/DL (ref 0.5–1.4)
CTP QC/QA: YES
CTP QC/QA: YES
DIFFERENTIAL METHOD: NORMAL
EOSINOPHIL # BLD AUTO: 0.4 K/UL (ref 0–0.5)
EOSINOPHIL NFR BLD: 4 % (ref 0–8)
ERYTHROCYTE [DISTWIDTH] IN BLOOD BY AUTOMATED COUNT: 13.4 % (ref 11.5–14.5)
EST. GFR  (AFRICAN AMERICAN): >60 ML/MIN/1.73 M^2
EST. GFR  (NON AFRICAN AMERICAN): >60 ML/MIN/1.73 M^2
ETHANOL SERPL-MCNC: <10 MG/DL
GLUCOSE SERPL-MCNC: 112 MG/DL (ref 70–110)
GLUCOSE UR QL STRIP: NEGATIVE
HCT VFR BLD AUTO: 41.4 % (ref 37–48.5)
HGB BLD-MCNC: 13.6 G/DL (ref 12–16)
HGB UR QL STRIP: ABNORMAL
HYALINE CASTS UR QL AUTO: 0 /LPF
IMM GRANULOCYTES # BLD AUTO: 0.02 K/UL (ref 0–0.04)
IMM GRANULOCYTES NFR BLD AUTO: 0.2 % (ref 0–0.5)
KETONES UR QL STRIP: NEGATIVE
LEUKOCYTE ESTERASE UR QL STRIP: NEGATIVE
LYMPHOCYTES # BLD AUTO: 2.7 K/UL (ref 1–4.8)
LYMPHOCYTES NFR BLD: 27.2 % (ref 18–48)
MCH RBC QN AUTO: 28.6 PG (ref 27–31)
MCHC RBC AUTO-ENTMCNC: 32.9 G/DL (ref 32–36)
MCV RBC AUTO: 87 FL (ref 82–98)
MICROSCOPIC COMMENT: ABNORMAL
MONOCYTES # BLD AUTO: 0.8 K/UL (ref 0.3–1)
MONOCYTES NFR BLD: 7.8 % (ref 4–15)
NEUTROPHILS # BLD AUTO: 6 K/UL (ref 1.8–7.7)
NEUTROPHILS NFR BLD: 60.3 % (ref 38–73)
NITRITE UR QL STRIP: NEGATIVE
NRBC BLD-RTO: 0 /100 WBC
PH UR STRIP: 6 [PH] (ref 5–8)
PLATELET # BLD AUTO: 323 K/UL (ref 150–350)
PMV BLD AUTO: 9.8 FL (ref 9.2–12.9)
POTASSIUM SERPL-SCNC: 2.9 MMOL/L (ref 3.5–5.1)
PROT SERPL-MCNC: 7.5 G/DL (ref 6–8.4)
PROT UR QL STRIP: ABNORMAL
RBC # BLD AUTO: 4.75 M/UL (ref 4–5.4)
RBC #/AREA URNS AUTO: 7 /HPF (ref 0–4)
SARS-COV-2 RDRP RESP QL NAA+PROBE: NEGATIVE
SODIUM SERPL-SCNC: 141 MMOL/L (ref 136–145)
SP GR UR STRIP: 1.01 (ref 1–1.03)
SQUAMOUS #/AREA URNS AUTO: 4 /HPF
TSH SERPL DL<=0.005 MIU/L-ACNC: 2.39 UIU/ML (ref 0.4–4)
URN SPEC COLLECT METH UR: ABNORMAL
WBC # BLD AUTO: 9.96 K/UL (ref 3.9–12.7)
WBC #/AREA URNS AUTO: 3 /HPF (ref 0–5)

## 2020-09-25 PROCEDURE — 99285 PR EMERGENCY DEPT VISIT,LEVEL V: ICD-10-PCS | Mod: ,,, | Performed by: PHYSICIAN ASSISTANT

## 2020-09-25 PROCEDURE — 80053 COMPREHEN METABOLIC PANEL: CPT

## 2020-09-25 PROCEDURE — 81001 URINALYSIS AUTO W/SCOPE: CPT | Mod: 59

## 2020-09-25 PROCEDURE — 81025 URINE PREGNANCY TEST: CPT | Performed by: PHYSICIAN ASSISTANT

## 2020-09-25 PROCEDURE — 84443 ASSAY THYROID STIM HORMONE: CPT

## 2020-09-25 PROCEDURE — 93010 ELECTROCARDIOGRAM REPORT: CPT | Mod: ,,, | Performed by: INTERNAL MEDICINE

## 2020-09-25 PROCEDURE — U0002 COVID-19 LAB TEST NON-CDC: HCPCS | Performed by: EMERGENCY MEDICINE

## 2020-09-25 PROCEDURE — 80329 ANALGESICS NON-OPIOID 1 OR 2: CPT

## 2020-09-25 PROCEDURE — 80307 DRUG TEST PRSMV CHEM ANLYZR: CPT

## 2020-09-25 PROCEDURE — 80320 DRUG SCREEN QUANTALCOHOLS: CPT

## 2020-09-25 PROCEDURE — 99284 EMERGENCY DEPT VISIT MOD MDM: CPT | Mod: 25

## 2020-09-25 PROCEDURE — 93005 ELECTROCARDIOGRAM TRACING: CPT

## 2020-09-25 PROCEDURE — 99285 EMERGENCY DEPT VISIT HI MDM: CPT | Mod: ,,, | Performed by: PHYSICIAN ASSISTANT

## 2020-09-25 PROCEDURE — 93010 EKG 12-LEAD: ICD-10-PCS | Mod: ,,, | Performed by: INTERNAL MEDICINE

## 2020-09-25 PROCEDURE — 85025 COMPLETE CBC W/AUTO DIFF WBC: CPT

## 2020-09-26 LAB
AMPHET+METHAMPHET UR QL: NORMAL
BARBITURATES UR QL SCN>200 NG/ML: NEGATIVE
BENZODIAZ UR QL SCN>200 NG/ML: NEGATIVE
BZE UR QL SCN: NEGATIVE
CANNABINOIDS UR QL SCN: NEGATIVE
CREAT UR-MCNC: 267 MG/DL (ref 15–325)
METHADONE UR QL SCN>300 NG/ML: NEGATIVE
OPIATES UR QL SCN: NEGATIVE
PCP UR QL SCN>25 NG/ML: NEGATIVE
TOXICOLOGY INFORMATION: NORMAL
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
## 2020-09-26 NOTE — ED NOTES
Patient changed into paper scrubs, personal belongings removed from room, labeled and stored in locker. Pt activities recorded q15 minutes while maintaining DVC.     x2 Green shirts   x1 (Pair) xavi shorts  x1 (Pair) shoes   x1 (Pair) black socks   x1 Necklace and Drivers license stored in Envelope

## 2020-09-26 NOTE — ED NOTES
Two patient identifiers checked and confirmed.    APPEARANCE: Resting comfortably in no acute distress. Patient has clean hair, skin and nails. Clothing is appropriate and properly fastened.  NEURO: Awake, alert, appropriate for age, and cooperative with a calm affect; pupils equal and round. Oriented x4.  HEENT: Head symmetrical. Bilateral eyes without redness or drainage.  CARDIAC: Increased rate and regular rhythm. S1, S2 noted.  RESPIRATORY: Airway is open and patent. Respirations are spontaneous on room air, even and unlabored. Normal respiratory effort and rate noted.   GI/: Abdomen soft and non-distended. Patient is reported to void and stool appropriately for age.   NEUROVASCULAR: All extremities are warm and pink with +2 pulses and capillary refill less than 3 seconds. No peripheral edema noted.  MUSCULOSKELETAL: Moves all extremities well; no obvious deformities noted. Pt ambulated independently with steady gait.   SKIN: Warm, dry, and intact. Mucus membranes moist and pink.   PSYCH: Pt appears anxious saying she wants to go home.
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
